# Patient Record
Sex: FEMALE | Race: WHITE | NOT HISPANIC OR LATINO | URBAN - METROPOLITAN AREA
[De-identification: names, ages, dates, MRNs, and addresses within clinical notes are randomized per-mention and may not be internally consistent; named-entity substitution may affect disease eponyms.]

---

## 2017-01-03 ENCOUNTER — OFFICE VISIT - HEALTHEAST (OUTPATIENT)
Dept: INTERNAL MEDICINE | Facility: CLINIC | Age: 82
End: 2017-01-03

## 2017-01-03 DIAGNOSIS — M17.9 DJD (DEGENERATIVE JOINT DISEASE) OF KNEE: ICD-10-CM

## 2017-01-03 DIAGNOSIS — R15.2 DEFECATION URGENCY: ICD-10-CM

## 2017-01-03 DIAGNOSIS — M47.816 DEGENERATIVE JOINT DISEASE (DJD) OF LUMBAR SPINE: ICD-10-CM

## 2017-01-03 DIAGNOSIS — K58.8 OTHER IRRITABLE BOWEL SYNDROME: ICD-10-CM

## 2017-01-03 ASSESSMENT — MIFFLIN-ST. JEOR: SCORE: 904.48

## 2017-02-06 ENCOUNTER — RECORDS - HEALTHEAST (OUTPATIENT)
Dept: ADMINISTRATIVE | Facility: OTHER | Age: 82
End: 2017-02-06

## 2017-02-07 ENCOUNTER — COMMUNICATION - HEALTHEAST (OUTPATIENT)
Dept: INTERNAL MEDICINE | Facility: CLINIC | Age: 82
End: 2017-02-07

## 2017-02-07 DIAGNOSIS — G47.00 INSOMNIA: ICD-10-CM

## 2017-02-16 ENCOUNTER — OFFICE VISIT - HEALTHEAST (OUTPATIENT)
Dept: INTERNAL MEDICINE | Facility: CLINIC | Age: 82
End: 2017-02-16

## 2017-02-16 DIAGNOSIS — S16.1XXA: ICD-10-CM

## 2017-02-16 DIAGNOSIS — R59.0 ENLARGED LYMPH NODE IN NECK: ICD-10-CM

## 2017-02-16 ASSESSMENT — MIFFLIN-ST. JEOR: SCORE: 931.7

## 2017-02-20 ENCOUNTER — TRANSFERRED RECORDS (OUTPATIENT)
Dept: HEALTH INFORMATION MANAGEMENT | Facility: CLINIC | Age: 82
End: 2017-02-20

## 2017-03-06 ENCOUNTER — COMMUNICATION - HEALTHEAST (OUTPATIENT)
Dept: INTERNAL MEDICINE | Facility: CLINIC | Age: 82
End: 2017-03-06

## 2017-03-07 ENCOUNTER — COMMUNICATION - HEALTHEAST (OUTPATIENT)
Dept: INTERNAL MEDICINE | Facility: CLINIC | Age: 82
End: 2017-03-07

## 2017-03-07 ENCOUNTER — OFFICE VISIT - HEALTHEAST (OUTPATIENT)
Dept: INTERNAL MEDICINE | Facility: CLINIC | Age: 82
End: 2017-03-07

## 2017-03-07 DIAGNOSIS — C18.0 MALIGNANT NEOPLASM OF CECUM (H): ICD-10-CM

## 2017-03-07 DIAGNOSIS — R73.9 HYPERGLYCEMIA: ICD-10-CM

## 2017-03-07 DIAGNOSIS — G25.81 RESTLESS LEGS SYNDROME (RLS): ICD-10-CM

## 2017-03-07 DIAGNOSIS — C50.919 MALIGNANT NEOPLASM OF FEMALE BREAST (H): ICD-10-CM

## 2017-03-07 DIAGNOSIS — R15.2 DEFECATION URGENCY: ICD-10-CM

## 2017-03-07 DIAGNOSIS — Z79.899 MEDICATION MANAGEMENT: ICD-10-CM

## 2017-03-07 LAB
CHOLEST SERPL-MCNC: 194 MG/DL
FASTING STATUS PATIENT QL REPORTED: YES
HBA1C MFR BLD: 5.8 % (ref 3.5–6)
HDLC SERPL-MCNC: 60 MG/DL
LDLC SERPL CALC-MCNC: 115 MG/DL
TRIGL SERPL-MCNC: 94 MG/DL

## 2017-03-07 ASSESSMENT — MIFFLIN-ST. JEOR: SCORE: 931.7

## 2017-03-17 ENCOUNTER — COMMUNICATION - HEALTHEAST (OUTPATIENT)
Dept: INTERNAL MEDICINE | Facility: CLINIC | Age: 82
End: 2017-03-17

## 2017-04-04 ENCOUNTER — HOSPITAL ENCOUNTER (OUTPATIENT)
Dept: EDUCATION SERVICES | Facility: CLINIC | Age: 82
End: 2017-04-04
Payer: MEDICARE

## 2017-04-10 ENCOUNTER — RECORDS - HEALTHEAST (OUTPATIENT)
Dept: ADMINISTRATIVE | Facility: OTHER | Age: 82
End: 2017-04-10

## 2017-05-08 ENCOUNTER — COMMUNICATION - HEALTHEAST (OUTPATIENT)
Dept: INTERNAL MEDICINE | Facility: CLINIC | Age: 82
End: 2017-05-08

## 2017-05-08 DIAGNOSIS — G47.00 INSOMNIA: ICD-10-CM

## 2017-06-02 ENCOUNTER — COMMUNICATION - HEALTHEAST (OUTPATIENT)
Dept: INTERNAL MEDICINE | Facility: CLINIC | Age: 82
End: 2017-06-02

## 2017-06-03 ENCOUNTER — COMMUNICATION - HEALTHEAST (OUTPATIENT)
Dept: INTERNAL MEDICINE | Facility: CLINIC | Age: 82
End: 2017-06-03

## 2017-06-05 ENCOUNTER — OFFICE VISIT - HEALTHEAST (OUTPATIENT)
Dept: INTERNAL MEDICINE | Facility: CLINIC | Age: 82
End: 2017-06-05

## 2017-06-05 DIAGNOSIS — K58.8 OTHER IRRITABLE BOWEL SYNDROME: ICD-10-CM

## 2017-06-05 DIAGNOSIS — C50.919 MALIGNANT NEOPLASM OF FEMALE BREAST (H): ICD-10-CM

## 2017-06-05 DIAGNOSIS — G43.909 MIGRAINE: ICD-10-CM

## 2017-06-05 DIAGNOSIS — C18.0 MALIGNANT NEOPLASM OF CECUM (H): ICD-10-CM

## 2017-06-05 DIAGNOSIS — M17.9 OSTEOARTHRITIS, KNEE: ICD-10-CM

## 2017-06-05 DIAGNOSIS — G25.81 RESTLESS LEGS SYNDROME (RLS): ICD-10-CM

## 2017-06-05 DIAGNOSIS — Z01.818 PREOP EXAMINATION: ICD-10-CM

## 2017-06-05 LAB
ATRIAL RATE - MUSE: 76 BPM
DIASTOLIC BLOOD PRESSURE - MUSE: NORMAL MMHG
INTERPRETATION ECG - MUSE: NORMAL
P AXIS - MUSE: 74 DEGREES
PR INTERVAL - MUSE: 200 MS
QRS DURATION - MUSE: 80 MS
QT - MUSE: 410 MS
QTC - MUSE: 457 MS
R AXIS - MUSE: 0 DEGREES
SYSTOLIC BLOOD PRESSURE - MUSE: NORMAL MMHG
T AXIS - MUSE: 30 DEGREES
VENTRICULAR RATE- MUSE: 75 BPM

## 2017-06-05 ASSESSMENT — MIFFLIN-ST. JEOR: SCORE: 909.02

## 2017-06-17 ENCOUNTER — COMMUNICATION - HEALTHEAST (OUTPATIENT)
Dept: INTERNAL MEDICINE | Facility: CLINIC | Age: 82
End: 2017-06-17

## 2017-06-23 ENCOUNTER — APPOINTMENT (OUTPATIENT)
Dept: SURGERY | Facility: CLINIC | Age: 82
End: 2017-06-23

## 2017-06-23 RX ORDER — DIPHENOXYLATE HCL/ATROPINE 2.5-.025MG
1 TABLET ORAL 4 TIMES DAILY PRN
COMMUNITY

## 2017-06-23 RX ORDER — FLUTICASONE PROPIONATE 50 MCG
2 SPRAY, SUSPENSION (ML) NASAL DAILY
COMMUNITY
End: 2021-12-23

## 2017-06-23 RX ORDER — BUTALBITAL/ASPIRIN/CAFFEINE 50-325-40
1 CAPSULE ORAL 3 TIMES DAILY PRN
COMMUNITY

## 2017-06-23 RX ORDER — DICYCLOMINE HCL 20 MG
20 TABLET ORAL 3 TIMES DAILY PRN
COMMUNITY

## 2017-06-23 RX ORDER — IRBESARTAN AND HYDROCHLOROTHIAZIDE 300; 12.5 MG/1; MG/1
1 TABLET, FILM COATED ORAL DAILY
COMMUNITY

## 2017-06-23 RX ORDER — SENNOSIDES 8.6 MG
650 CAPSULE ORAL EVERY 8 HOURS PRN
COMMUNITY

## 2017-06-23 RX ORDER — SUMATRIPTAN 25 MG/1
25 TABLET, FILM COATED ORAL
COMMUNITY

## 2017-06-25 ENCOUNTER — ANESTHESIA EVENT (OUTPATIENT)
Dept: SURGERY | Facility: CLINIC | Age: 82
DRG: 470 | End: 2017-06-25
Payer: MEDICARE

## 2017-06-26 ENCOUNTER — ANESTHESIA (OUTPATIENT)
Dept: SURGERY | Facility: CLINIC | Age: 82
DRG: 470 | End: 2017-06-26
Payer: MEDICARE

## 2017-06-26 ENCOUNTER — HOSPITAL ENCOUNTER (INPATIENT)
Facility: CLINIC | Age: 82
LOS: 3 days | Discharge: HOME | DRG: 470 | End: 2017-06-29
Attending: ORTHOPAEDIC SURGERY | Admitting: ORTHOPAEDIC SURGERY
Payer: MEDICARE

## 2017-06-26 ENCOUNTER — APPOINTMENT (OUTPATIENT)
Dept: PHYSICAL THERAPY | Facility: CLINIC | Age: 82
DRG: 470 | End: 2017-06-26
Attending: ORTHOPAEDIC SURGERY
Payer: MEDICARE

## 2017-06-26 ENCOUNTER — APPOINTMENT (OUTPATIENT)
Dept: GENERAL RADIOLOGY | Facility: CLINIC | Age: 82
DRG: 470 | End: 2017-06-26
Attending: ORTHOPAEDIC SURGERY
Payer: MEDICARE

## 2017-06-26 DIAGNOSIS — Z96.651 HX OF TOTAL KNEE ARTHROPLASTY, RIGHT: Primary | ICD-10-CM

## 2017-06-26 PROBLEM — Z96.659 HX OF TOTAL KNEE ARTHROPLASTY: Status: ACTIVE | Noted: 2017-06-26

## 2017-06-26 PROBLEM — Z41.9 ELECTIVE SURGERY: Status: ACTIVE | Noted: 2017-06-26

## 2017-06-26 PROCEDURE — 40000986 XR KNEE PORT RT 1/2 VW: Mod: RT

## 2017-06-26 PROCEDURE — 25000125 ZZHC RX 250: Performed by: ANESTHESIOLOGY

## 2017-06-26 PROCEDURE — 25000132 ZZH RX MED GY IP 250 OP 250 PS 637: Mod: GY | Performed by: INTERNAL MEDICINE

## 2017-06-26 PROCEDURE — 27210995 ZZH RX 272: Performed by: ORTHOPAEDIC SURGERY

## 2017-06-26 PROCEDURE — 36000074 ZZH SURGERY LEVEL 6 1ST 30 MIN - UMMC: Performed by: ORTHOPAEDIC SURGERY

## 2017-06-26 PROCEDURE — 37000009 ZZH ANESTHESIA TECHNICAL FEE, EACH ADDTL 15 MIN: Performed by: ORTHOPAEDIC SURGERY

## 2017-06-26 PROCEDURE — 97110 THERAPEUTIC EXERCISES: CPT | Mod: GP

## 2017-06-26 PROCEDURE — 71000015 ZZH RECOVERY PHASE 1 LEVEL 2 EA ADDTL HR: Performed by: ORTHOPAEDIC SURGERY

## 2017-06-26 PROCEDURE — 97161 PT EVAL LOW COMPLEX 20 MIN: CPT | Mod: GP

## 2017-06-26 PROCEDURE — 25000128 H RX IP 250 OP 636: Performed by: NURSE ANESTHETIST, CERTIFIED REGISTERED

## 2017-06-26 PROCEDURE — 40000170 ZZH STATISTIC PRE-PROCEDURE ASSESSMENT II: Performed by: ORTHOPAEDIC SURGERY

## 2017-06-26 PROCEDURE — 25000125 ZZHC RX 250: Performed by: NURSE ANESTHETIST, CERTIFIED REGISTERED

## 2017-06-26 PROCEDURE — C1776 JOINT DEVICE (IMPLANTABLE): HCPCS | Performed by: ORTHOPAEDIC SURGERY

## 2017-06-26 PROCEDURE — 40000193 ZZH STATISTIC PT WARD VISIT

## 2017-06-26 PROCEDURE — 99232 SBSQ HOSP IP/OBS MODERATE 35: CPT | Performed by: INTERNAL MEDICINE

## 2017-06-26 PROCEDURE — 25000128 H RX IP 250 OP 636: Performed by: ANESTHESIOLOGY

## 2017-06-26 PROCEDURE — 25000128 H RX IP 250 OP 636: Performed by: ORTHOPAEDIC SURGERY

## 2017-06-26 PROCEDURE — 25800025 ZZH RX 258: Performed by: ORTHOPAEDIC SURGERY

## 2017-06-26 PROCEDURE — A9270 NON-COVERED ITEM OR SERVICE: HCPCS | Mod: GY | Performed by: INTERNAL MEDICINE

## 2017-06-26 PROCEDURE — A9270 NON-COVERED ITEM OR SERVICE: HCPCS | Mod: GY | Performed by: NURSE PRACTITIONER

## 2017-06-26 PROCEDURE — C9290 INJ, BUPIVACAINE LIPOSOME: HCPCS | Performed by: ANESTHESIOLOGY

## 2017-06-26 PROCEDURE — 27810169 ZZH OR IMPLANT GENERAL: Performed by: ORTHOPAEDIC SURGERY

## 2017-06-26 PROCEDURE — 25000132 ZZH RX MED GY IP 250 OP 250 PS 637: Mod: GY | Performed by: ANESTHESIOLOGY

## 2017-06-26 PROCEDURE — 25000132 ZZH RX MED GY IP 250 OP 250 PS 637: Mod: GY | Performed by: NURSE PRACTITIONER

## 2017-06-26 PROCEDURE — 37000008 ZZH ANESTHESIA TECHNICAL FEE, 1ST 30 MIN: Performed by: ORTHOPAEDIC SURGERY

## 2017-06-26 PROCEDURE — 71000014 ZZH RECOVERY PHASE 1 LEVEL 2 FIRST HR: Performed by: ORTHOPAEDIC SURGERY

## 2017-06-26 PROCEDURE — A9270 NON-COVERED ITEM OR SERVICE: HCPCS | Mod: GY | Performed by: ORTHOPAEDIC SURGERY

## 2017-06-26 PROCEDURE — A9270 NON-COVERED ITEM OR SERVICE: HCPCS | Mod: GY | Performed by: ANESTHESIOLOGY

## 2017-06-26 PROCEDURE — 25000125 ZZHC RX 250: Performed by: ORTHOPAEDIC SURGERY

## 2017-06-26 PROCEDURE — 25000132 ZZH RX MED GY IP 250 OP 250 PS 637: Mod: GY | Performed by: ORTHOPAEDIC SURGERY

## 2017-06-26 PROCEDURE — 27210794 ZZH OR GENERAL SUPPLY STERILE: Performed by: ORTHOPAEDIC SURGERY

## 2017-06-26 PROCEDURE — 97530 THERAPEUTIC ACTIVITIES: CPT | Mod: GP

## 2017-06-26 PROCEDURE — 12000001 ZZH R&B MED SURG/OB UMMC

## 2017-06-26 PROCEDURE — 36000076 ZZH SURGERY LEVEL 6 EA 15 ADDTL MIN - UMMC: Performed by: ORTHOPAEDIC SURGERY

## 2017-06-26 PROCEDURE — 99207 ZZC CONSULT E&M CHANGED TO SUBSEQUENT LEVEL: CPT | Performed by: INTERNAL MEDICINE

## 2017-06-26 PROCEDURE — 0SRC0J9 REPLACEMENT OF RIGHT KNEE JOINT WITH SYNTHETIC SUBSTITUTE, CEMENTED, OPEN APPROACH: ICD-10-PCS | Performed by: ORTHOPAEDIC SURGERY

## 2017-06-26 DEVICE — IMP BASEPLATE TIBIAL HOWM TRI 3 5520-B-300: Type: IMPLANTABLE DEVICE | Site: KNEE | Status: FUNCTIONAL

## 2017-06-26 DEVICE — BONE CEMENT SIMPLEX W/TOBRAMYCIN 6197-9-001: Type: IMPLANTABLE DEVICE | Site: KNEE | Status: FUNCTIONAL

## 2017-06-26 DEVICE — IMP COMP FEM STRK TRIATHLN CR RT 3 5510-F-302: Type: IMPLANTABLE DEVICE | Site: KNEE | Status: FUNCTIONAL

## 2017-06-26 DEVICE — IMPLANTABLE DEVICE: Type: IMPLANTABLE DEVICE | Site: KNEE | Status: FUNCTIONAL

## 2017-06-26 DEVICE — IMP COMP PATELLA HOWM TRI ASYM 29X09MM 5551-G-299: Type: IMPLANTABLE DEVICE | Site: KNEE | Status: FUNCTIONAL

## 2017-06-26 RX ORDER — GABAPENTIN 100 MG/1
100 CAPSULE ORAL
Status: COMPLETED | OUTPATIENT
Start: 2017-06-26 | End: 2017-06-26

## 2017-06-26 RX ORDER — AMOXICILLIN 250 MG
1-2 CAPSULE ORAL 2 TIMES DAILY
Status: DISCONTINUED | OUTPATIENT
Start: 2017-06-26 | End: 2017-06-29 | Stop reason: HOSPADM

## 2017-06-26 RX ORDER — ONDANSETRON 2 MG/ML
4 INJECTION INTRAMUSCULAR; INTRAVENOUS EVERY 30 MIN PRN
Status: DISCONTINUED | OUTPATIENT
Start: 2017-06-26 | End: 2017-06-26 | Stop reason: HOSPADM

## 2017-06-26 RX ORDER — PROPOFOL 10 MG/ML
INJECTION, EMULSION INTRAVENOUS CONTINUOUS PRN
Status: DISCONTINUED | OUTPATIENT
Start: 2017-06-26 | End: 2017-06-26

## 2017-06-26 RX ORDER — IRBESARTAN AND HYDROCHLOROTHIAZIDE 300; 12.5 MG/1; MG/1
1 TABLET, FILM COATED ORAL
Status: DISCONTINUED | OUTPATIENT
Start: 2017-06-26 | End: 2017-06-29 | Stop reason: HOSPADM

## 2017-06-26 RX ORDER — ONDANSETRON 4 MG/1
4 TABLET, ORALLY DISINTEGRATING ORAL EVERY 6 HOURS PRN
Status: DISCONTINUED | OUTPATIENT
Start: 2017-06-26 | End: 2017-06-29 | Stop reason: HOSPADM

## 2017-06-26 RX ORDER — CEFAZOLIN SODIUM 1 G/3ML
1 INJECTION, POWDER, FOR SOLUTION INTRAMUSCULAR; INTRAVENOUS SEE ADMIN INSTRUCTIONS
Status: DISCONTINUED | OUTPATIENT
Start: 2017-06-26 | End: 2017-06-26 | Stop reason: HOSPADM

## 2017-06-26 RX ORDER — FLUTICASONE PROPIONATE 50 MCG
2 SPRAY, SUSPENSION (ML) NASAL DAILY
Status: DISCONTINUED | OUTPATIENT
Start: 2017-06-26 | End: 2017-06-29 | Stop reason: HOSPADM

## 2017-06-26 RX ORDER — DICYCLOMINE HCL 20 MG
20 TABLET ORAL 3 TIMES DAILY PRN
Status: DISCONTINUED | OUTPATIENT
Start: 2017-06-26 | End: 2017-06-29 | Stop reason: HOSPADM

## 2017-06-26 RX ORDER — CEFAZOLIN SODIUM 2 G/100ML
2 INJECTION, SOLUTION INTRAVENOUS
Status: COMPLETED | OUTPATIENT
Start: 2017-06-26 | End: 2017-06-26

## 2017-06-26 RX ORDER — SUMATRIPTAN 25 MG/1
25 TABLET, FILM COATED ORAL
Status: DISCONTINUED | OUTPATIENT
Start: 2017-06-26 | End: 2017-06-29 | Stop reason: HOSPADM

## 2017-06-26 RX ORDER — HYDROCODONE BITARTRATE AND ACETAMINOPHEN 5; 325 MG/1; MG/1
1-2 TABLET ORAL EVERY 4 HOURS PRN
Status: DISCONTINUED | OUTPATIENT
Start: 2017-06-26 | End: 2017-06-26

## 2017-06-26 RX ORDER — ASPIRIN 325 MG/1
325 TABLET, FILM COATED ORAL DAILY
Status: DISCONTINUED | OUTPATIENT
Start: 2017-06-26 | End: 2017-06-29 | Stop reason: HOSPADM

## 2017-06-26 RX ORDER — LIDOCAINE HYDROCHLORIDE 20 MG/ML
INJECTION, SOLUTION INFILTRATION; PERINEURAL PRN
Status: DISCONTINUED | OUTPATIENT
Start: 2017-06-26 | End: 2017-06-26

## 2017-06-26 RX ORDER — CELECOXIB 200 MG/1
200 CAPSULE ORAL ONCE
Status: COMPLETED | OUTPATIENT
Start: 2017-06-26 | End: 2017-06-26

## 2017-06-26 RX ORDER — BUPIVACAINE HYDROCHLORIDE 7.5 MG/ML
INJECTION, SOLUTION INTRASPINAL PRN
Status: DISCONTINUED | OUTPATIENT
Start: 2017-06-26 | End: 2017-06-26

## 2017-06-26 RX ORDER — LIDOCAINE 40 MG/G
CREAM TOPICAL
Status: DISCONTINUED | OUTPATIENT
Start: 2017-06-26 | End: 2017-06-29 | Stop reason: HOSPADM

## 2017-06-26 RX ORDER — FENTANYL CITRATE 50 UG/ML
25-50 INJECTION, SOLUTION INTRAMUSCULAR; INTRAVENOUS
Status: DISCONTINUED | OUTPATIENT
Start: 2017-06-26 | End: 2017-06-26 | Stop reason: HOSPADM

## 2017-06-26 RX ORDER — DEXAMETHASONE SODIUM PHOSPHATE 4 MG/ML
INJECTION, SOLUTION INTRA-ARTICULAR; INTRALESIONAL; INTRAMUSCULAR; INTRAVENOUS; SOFT TISSUE PRN
Status: DISCONTINUED | OUTPATIENT
Start: 2017-06-26 | End: 2017-06-26

## 2017-06-26 RX ORDER — DIPHENOXYLATE HCL/ATROPINE 2.5-.025MG
1 TABLET ORAL 4 TIMES DAILY PRN
Status: DISCONTINUED | OUTPATIENT
Start: 2017-06-26 | End: 2017-06-29 | Stop reason: HOSPADM

## 2017-06-26 RX ORDER — SODIUM CHLORIDE, SODIUM LACTATE, POTASSIUM CHLORIDE, CALCIUM CHLORIDE 600; 310; 30; 20 MG/100ML; MG/100ML; MG/100ML; MG/100ML
INJECTION, SOLUTION INTRAVENOUS CONTINUOUS
Status: ACTIVE | OUTPATIENT
Start: 2017-06-26 | End: 2017-06-26

## 2017-06-26 RX ORDER — NALOXONE HYDROCHLORIDE 0.4 MG/ML
.1-.4 INJECTION, SOLUTION INTRAMUSCULAR; INTRAVENOUS; SUBCUTANEOUS
Status: DISCONTINUED | OUTPATIENT
Start: 2017-06-26 | End: 2017-06-26 | Stop reason: HOSPADM

## 2017-06-26 RX ORDER — CEFAZOLIN SODIUM 1 G/3ML
1 INJECTION, POWDER, FOR SOLUTION INTRAMUSCULAR; INTRAVENOUS EVERY 8 HOURS
Status: COMPLETED | OUTPATIENT
Start: 2017-06-26 | End: 2017-06-27

## 2017-06-26 RX ORDER — MAGNESIUM HYDROXIDE 1200 MG/15ML
LIQUID ORAL PRN
Status: DISCONTINUED | OUTPATIENT
Start: 2017-06-26 | End: 2017-06-26 | Stop reason: HOSPADM

## 2017-06-26 RX ORDER — NALOXONE HYDROCHLORIDE 0.4 MG/ML
.1-.4 INJECTION, SOLUTION INTRAMUSCULAR; INTRAVENOUS; SUBCUTANEOUS
Status: DISCONTINUED | OUTPATIENT
Start: 2017-06-26 | End: 2017-06-29 | Stop reason: HOSPADM

## 2017-06-26 RX ORDER — FLUMAZENIL 0.1 MG/ML
0.2 INJECTION, SOLUTION INTRAVENOUS
Status: DISCONTINUED | OUTPATIENT
Start: 2017-06-26 | End: 2017-06-26 | Stop reason: HOSPADM

## 2017-06-26 RX ORDER — BUPIVACAINE HYDROCHLORIDE AND EPINEPHRINE 2.5; 5 MG/ML; UG/ML
INJECTION, SOLUTION INFILTRATION; PERINEURAL PRN
Status: DISCONTINUED | OUTPATIENT
Start: 2017-06-26 | End: 2017-06-26

## 2017-06-26 RX ORDER — PRAMIPEXOLE DIHYDROCHLORIDE 1 MG/1
1 TABLET ORAL AT BEDTIME
Status: DISCONTINUED | OUTPATIENT
Start: 2017-06-26 | End: 2017-06-29 | Stop reason: HOSPADM

## 2017-06-26 RX ORDER — BUPIVACAINE HYDROCHLORIDE AND EPINEPHRINE 5; 5 MG/ML; UG/ML
INJECTION, SOLUTION PERINEURAL PRN
Status: DISCONTINUED | OUTPATIENT
Start: 2017-06-26 | End: 2017-06-26 | Stop reason: HOSPADM

## 2017-06-26 RX ORDER — ONDANSETRON 2 MG/ML
4 INJECTION INTRAMUSCULAR; INTRAVENOUS EVERY 6 HOURS PRN
Status: DISCONTINUED | OUTPATIENT
Start: 2017-06-26 | End: 2017-06-29 | Stop reason: HOSPADM

## 2017-06-26 RX ORDER — GABAPENTIN 800 MG/1
800 TABLET ORAL AT BEDTIME
Status: DISCONTINUED | OUTPATIENT
Start: 2017-06-26 | End: 2017-06-29 | Stop reason: HOSPADM

## 2017-06-26 RX ORDER — BUTALBITAL/ASPIRIN/CAFFEINE 50-325-40
1 CAPSULE ORAL 3 TIMES DAILY PRN
Status: DISCONTINUED | OUTPATIENT
Start: 2017-06-26 | End: 2017-06-29 | Stop reason: HOSPADM

## 2017-06-26 RX ORDER — SODIUM CHLORIDE, SODIUM LACTATE, POTASSIUM CHLORIDE, CALCIUM CHLORIDE 600; 310; 30; 20 MG/100ML; MG/100ML; MG/100ML; MG/100ML
INJECTION, SOLUTION INTRAVENOUS CONTINUOUS
Status: DISCONTINUED | OUTPATIENT
Start: 2017-06-26 | End: 2017-06-26 | Stop reason: HOSPADM

## 2017-06-26 RX ORDER — OXYCODONE HYDROCHLORIDE 5 MG/1
5-10 TABLET ORAL EVERY 4 HOURS PRN
Status: DISCONTINUED | OUTPATIENT
Start: 2017-06-26 | End: 2017-06-27

## 2017-06-26 RX ORDER — ACETAMINOPHEN 325 MG/1
975 TABLET ORAL ONCE
Status: COMPLETED | OUTPATIENT
Start: 2017-06-26 | End: 2017-06-26

## 2017-06-26 RX ORDER — SODIUM CHLORIDE, SODIUM LACTATE, POTASSIUM CHLORIDE, CALCIUM CHLORIDE 600; 310; 30; 20 MG/100ML; MG/100ML; MG/100ML; MG/100ML
INJECTION, SOLUTION INTRAVENOUS CONTINUOUS PRN
Status: DISCONTINUED | OUTPATIENT
Start: 2017-06-26 | End: 2017-06-26

## 2017-06-26 RX ORDER — FENTANYL CITRATE 50 UG/ML
25-50 INJECTION, SOLUTION INTRAMUSCULAR; INTRAVENOUS EVERY 5 MIN PRN
Status: DISCONTINUED | OUTPATIENT
Start: 2017-06-26 | End: 2017-06-26 | Stop reason: HOSPADM

## 2017-06-26 RX ORDER — PRAMIPEXOLE DIHYDROCHLORIDE 0.5 MG/1
0.5 TABLET ORAL
COMMUNITY
Start: 2016-01-15

## 2017-06-26 RX ORDER — CELECOXIB 50 MG/1
50 CAPSULE ORAL 2 TIMES DAILY
Status: DISCONTINUED | OUTPATIENT
Start: 2017-06-26 | End: 2017-06-26

## 2017-06-26 RX ORDER — GABAPENTIN 800 MG/1
800 TABLET ORAL DAILY
Status: ON HOLD | COMMUNITY
Start: 2012-08-05 | End: 2017-06-26

## 2017-06-26 RX ORDER — HYDROMORPHONE HYDROCHLORIDE 1 MG/ML
.3-.5 INJECTION, SOLUTION INTRAMUSCULAR; INTRAVENOUS; SUBCUTANEOUS EVERY 10 MIN PRN
Status: DISCONTINUED | OUTPATIENT
Start: 2017-06-26 | End: 2017-06-26 | Stop reason: HOSPADM

## 2017-06-26 RX ORDER — ONDANSETRON 2 MG/ML
INJECTION INTRAMUSCULAR; INTRAVENOUS PRN
Status: DISCONTINUED | OUTPATIENT
Start: 2017-06-26 | End: 2017-06-26

## 2017-06-26 RX ORDER — ONDANSETRON 4 MG/1
4 TABLET, ORALLY DISINTEGRATING ORAL EVERY 30 MIN PRN
Status: DISCONTINUED | OUTPATIENT
Start: 2017-06-26 | End: 2017-06-26 | Stop reason: HOSPADM

## 2017-06-26 RX ADMIN — PROPOFOL 75 MCG/KG/MIN: 10 INJECTION, EMULSION INTRAVENOUS at 09:52

## 2017-06-26 RX ADMIN — SODIUM CHLORIDE, POTASSIUM CHLORIDE, SODIUM LACTATE AND CALCIUM CHLORIDE: 600; 310; 30; 20 INJECTION, SOLUTION INTRAVENOUS at 11:25

## 2017-06-26 RX ADMIN — ACETAMINOPHEN 975 MG: 325 TABLET, FILM COATED ORAL at 08:05

## 2017-06-26 RX ADMIN — GABAPENTIN 100 MG: 100 CAPSULE ORAL at 08:05

## 2017-06-26 RX ADMIN — OXYCODONE HYDROCHLORIDE 5 MG: 5 TABLET ORAL at 22:11

## 2017-06-26 RX ADMIN — SENNOSIDES AND DOCUSATE SODIUM 2 TABLET: 8.6; 5 TABLET ORAL at 19:31

## 2017-06-26 RX ADMIN — PHENYLEPHRINE HYDROCHLORIDE 100 MCG: 10 INJECTION, SOLUTION INTRAMUSCULAR; INTRAVENOUS; SUBCUTANEOUS at 10:35

## 2017-06-26 RX ADMIN — OXYCODONE HYDROCHLORIDE 5 MG: 5 TABLET ORAL at 18:17

## 2017-06-26 RX ADMIN — BUPIVACAINE HYDROCHLORIDE AND EPINEPHRINE BITARTRATE 10 ML: 2.5; .005 INJECTION, SOLUTION INFILTRATION; PERINEURAL at 08:49

## 2017-06-26 RX ADMIN — LIDOCAINE HYDROCHLORIDE 20 MG: 20 INJECTION, SOLUTION INFILTRATION; PERINEURAL at 10:08

## 2017-06-26 RX ADMIN — FENTANYL CITRATE 50 MCG: 50 INJECTION, SOLUTION INTRAMUSCULAR; INTRAVENOUS at 08:49

## 2017-06-26 RX ADMIN — PHENYLEPHRINE HYDROCHLORIDE 100 MCG: 10 INJECTION, SOLUTION INTRAMUSCULAR; INTRAVENOUS; SUBCUTANEOUS at 11:04

## 2017-06-26 RX ADMIN — CEFAZOLIN SODIUM 2 G: 2 INJECTION, SOLUTION INTRAVENOUS at 09:55

## 2017-06-26 RX ADMIN — GABAPENTIN 800 MG: 800 TABLET, FILM COATED ORAL at 20:56

## 2017-06-26 RX ADMIN — PRAMIPEXOLE DIHYDROCHLORIDE 1 MG: 1 TABLET ORAL at 20:56

## 2017-06-26 RX ADMIN — BUPIVACAINE 10 ML: 13.3 INJECTION, SUSPENSION, LIPOSOMAL INFILTRATION at 08:49

## 2017-06-26 RX ADMIN — OXYCODONE HYDROCHLORIDE 5 MG: 5 TABLET ORAL at 15:03

## 2017-06-26 RX ADMIN — PHENYLEPHRINE HYDROCHLORIDE 100 MCG: 10 INJECTION, SOLUTION INTRAMUSCULAR; INTRAVENOUS; SUBCUTANEOUS at 09:52

## 2017-06-26 RX ADMIN — LIDOCAINE HYDROCHLORIDE 20 MG: 20 INJECTION, SOLUTION INFILTRATION; PERINEURAL at 10:11

## 2017-06-26 RX ADMIN — CEFAZOLIN 1 G: 1 INJECTION, POWDER, FOR SOLUTION INTRAMUSCULAR; INTRAVENOUS at 18:16

## 2017-06-26 RX ADMIN — CELECOXIB 200 MG: 200 CAPSULE ORAL at 08:05

## 2017-06-26 RX ADMIN — ASPIRIN 325 MG: 325 TABLET, FILM COATED ORAL at 15:03

## 2017-06-26 RX ADMIN — MIDAZOLAM HYDROCHLORIDE 0.5 MG: 1 INJECTION, SOLUTION INTRAMUSCULAR; INTRAVENOUS at 09:29

## 2017-06-26 RX ADMIN — MIDAZOLAM HYDROCHLORIDE 0.5 MG: 1 INJECTION, SOLUTION INTRAMUSCULAR; INTRAVENOUS at 10:52

## 2017-06-26 RX ADMIN — ONDANSETRON 4 MG: 2 INJECTION INTRAMUSCULAR; INTRAVENOUS at 10:54

## 2017-06-26 RX ADMIN — BUPIVACAINE HYDROCHLORIDE IN DEXTROSE 1.4 ML: 7.5 INJECTION, SOLUTION SUBARACHNOID at 09:50

## 2017-06-26 RX ADMIN — Medication 50 MG: at 22:11

## 2017-06-26 RX ADMIN — PHENYLEPHRINE HYDROCHLORIDE 100 MCG: 10 INJECTION, SOLUTION INTRAMUSCULAR; INTRAVENOUS; SUBCUTANEOUS at 11:20

## 2017-06-26 RX ADMIN — SODIUM CHLORIDE, POTASSIUM CHLORIDE, SODIUM LACTATE AND CALCIUM CHLORIDE: 600; 310; 30; 20 INJECTION, SOLUTION INTRAVENOUS at 09:30

## 2017-06-26 RX ADMIN — DEXAMETHASONE SODIUM PHOSPHATE 6 MG: 4 INJECTION, SOLUTION INTRAMUSCULAR; INTRAVENOUS at 10:00

## 2017-06-26 RX ADMIN — SODIUM CHLORIDE 1 G: 9 INJECTION, SOLUTION INTRAVENOUS at 09:57

## 2017-06-26 RX ADMIN — PHENYLEPHRINE HYDROCHLORIDE 100 MCG: 10 INJECTION, SOLUTION INTRAMUSCULAR; INTRAVENOUS; SUBCUTANEOUS at 10:51

## 2017-06-26 ASSESSMENT — LIFESTYLE VARIABLES: TOBACCO_USE: 0

## 2017-06-26 ASSESSMENT — ENCOUNTER SYMPTOMS: SEIZURES: 0

## 2017-06-26 NOTE — ANESTHESIA CARE TRANSFER NOTE
Patient: Mitra Hall    Procedure(s):  Optical Tracking System Right Total Knee Arthroplasty - Wound Class: I-Clean    Diagnosis: Osteoarthritis Right Knee   Diagnosis Additional Information: No value filed.    Anesthesia Type:   MAC, Spinal     Note:  Airway :Face Mask  Patient transferred to:PACU  Comments: Mitra arrived in PACU speaking to her caregivers.  Report given and all questions answered.      Vitals: (Last set prior to Anesthesia Care Transfer)    CRNA VITALS  6/26/2017 1103 - 6/26/2017 1138      6/26/2017             Resp Rate (set): 10                Electronically Signed By: Jadon Dumas CRNA, APRN CRNA  June 26, 2017  11:38 AM

## 2017-06-26 NOTE — ANESTHESIA PREPROCEDURE EVALUATION
Anesthesia Evaluation     . Pt has had prior anesthetic. Type: General    No history of anesthetic complications          ROS/MED HX    ENT/Pulmonary:  - neg pulmonary ROS    (-) tobacco use, asthma and sleep apnea   Neurologic:     (+)migraines,    (-) seizures, CVA and TIA   Cardiovascular:  - neg cardiovascular ROS       METS/Exercise Tolerance: Comment: Bikes for exercise >4 METS   Hematologic:  - neg hematologic  ROS       Musculoskeletal:  - neg musculoskeletal ROS       GI/Hepatic: Comment: IBS - neg GI/hepatic ROS       Renal/Genitourinary:  - ROS Renal section negative       Endo:  - neg endo ROS       Psychiatric: Comment: RLS - gabapentin          Infectious Disease:         Malignancy:   (+) Malignancy History of Breast and GI          Other:                     Physical Exam  Normal systems: dental    Airway   Mallampati: II  TM distance: >3 FB  Neck ROM: full    Dental     Cardiovascular       Pulmonary                     Anesthesia Plan      History & Physical Review  History and physical reviewed and following examination; no interval change.    ASA Status:  2 .    NPO Status:  > 2 hours and > 6 hours    Plan for Spinal and MAC with Intravenous induction. Reason for MAC:  Deep or markedly invasive procedure (G8)  PONV prophylaxis:  Ondansetron (or other 5HT-3) and Dexamethasone or Solumedrol  Spinal anesthesia was discussed with the patient. They understand the risks include headache, pruritus, nausea/vomiting, blood pressure changes, bleeding, infection, nerve damage, and failure of block. Questions answered. Patient consents. She understands that the backup plan is general anesthesia.   Risks, benefits, and alternatives of MAC discussed with patient. They understand the risks including possible recall of events in the room. They understand there is a possibility that conversion to general anesthesia may be needed. Patient consents.        Postoperative Care  Postoperative pain management:  IV  analgesics, Oral pain medications, Multi-modal analgesia and Peripheral nerve block (Single Shot).      Consents  Anesthetic plan, risks, benefits and alternatives discussed with:  Patient and Spouse..                          .

## 2017-06-26 NOTE — PROGRESS NOTES
" 06/26/17 1417   Quick Adds   Type of Visit Initial PT Evaluation       Present no   Language English   Living Environment   Lives With spouse   Living Arrangements house   Home Accessibility stairs to enter home   Number of Stairs to Enter Home 3   Number of Stairs Within Home 0   Stair Railings at Home (single rail)   Transportation Available family or friend will provide   Living Environment Comment Pt lives in a rambler with her . Has a few stairs to enter but then will remain on 1 level   Self-Care   Dominant Hand right   Usual Activity Tolerance good   Current Activity Tolerance moderate   Regular Exercise yes   Activity/Exercise Type strength training;walking  (Active at the [facility])   Exercise Amount/Frequency 3-5 times/wk   Equipment Currently Used at Home none  (has walker)   Functional Level Prior   Ambulation 0-->independent   Transferring 0-->independent   Toileting 0-->independent   Bathing 0-->independent   Dressing 0-->independent   Eating 0-->independent   Communication 0-->understands/communicates without difficulty   Swallowing 0-->swallows foods/liquids without difficulty   Cognition 0 - no cognition issues reported   Fall history within last six months no   Which of the above functional risks had a recent onset or change? ambulation;transferring   General Information   Onset of Illness/Injury or Date of Surgery - Date 06/26/17   Referring Physician Ford Medina MD   Patient/Family Goals Statement \"To get better and back to day to day activities\"   Pertinent History of Current Problem (include personal factors and/or comorbidities that impact the POC) s/p R TKA   Precautions/Limitations fall precautions   Weight-Bearing Status - LUE full weight-bearing   Weight-Bearing Status - RUE full weight-bearing   Weight-Bearing Status - LLE full weight-bearing   Weight-Bearing Status - RLE weight-bearing as tolerated   General Observations Supine in bed upon arrival, " pleasant and agreeabl   General Info Comments IV, CPM, hemovac   Cognitive Status Examination   Orientation orientation to person, place and time   Level of Consciousness alert   Follows Commands and Answers Questions 100% of the time;able to follow multistep instructions   Personal Safety and Judgment intact   Memory intact   Pain Assessment   Patient Currently in Pain Yes, see Vital Sign flowsheet   Integumentary/Edema   Integumentary/Edema no deficits were identifed   Posture    Posture Not impaired   Range of Motion (ROM)   ROM Comment R knee AAROM=0-4-85. L LE WFL   Strength   Strength Comments Did not formally assess, demonstrates good quality contractions with supine exercises, independent with SLR on R. On R LE not quite able to DF yet, trace   Bed Mobility   Bed Mobility Comments Pt completes supine<>sit transfer with CGA to R LE only   Transfer Skills   Transfer Comments Completes sit<>stand transfer with CGA and use of walker   Gait   Gait Comments Pt takes steps from bed<>commode with CGA and use of walker, mild instability noted   Balance   Balance Comments Supervision level sitting balance, CGA for standing balance with UE support from walker   Sensory Examination   Sensory Perception Comments Light touch sensation grossly intact   General Therapy Interventions   Planned Therapy Interventions balance training;bed mobility training;gait training;ROM;strengthening;transfer training;risk factor education;home program guidelines;progressive activity/exercise   Clinical Impression   Criteria for Skilled Therapeutic Intervention yes, treatment indicated   PT Diagnosis impaired functional mobility   Influenced by the following impairments increased post-op pain, decreased R LE strength and ROM   Functional limitations due to impairments impaired bed mobility, transfers and ambulation   Clinical Presentation Stable/Uncomplicated   Clinical Presentation Rationale s/p R TKA, mobilizing well with minimal  "co-morbidities   Clinical Decision Making (Complexity) Low complexity   Therapy Frequency` 2 times/day   Predicted Duration of Therapy Intervention (days/wks) 4 days   Anticipated Equipment Needs at Discharge (has walker)   Anticipated Discharge Disposition Home with Assist;Home with Home Therapy;Home with Outpatient Therapy  (Home PT vs OP PT, anticipate OP PT)   Risk & Benefits of therapy have been explained Yes   Patient, Family & other staff in agreement with plan of care Yes   [facility] TM \"6 Clicks\"   2016, Trustees of [facility], under license to [employer].  All rights reserved.   6 Clicks Short Forms Basic Mobility Inpatient Short Form   [facility] AM-PAC  \"6 Clicks\" V.2 Basic Mobility Inpatient Short Form   1. Turning from your back to your side while in a flat bed without using bedrails? 4 - None   2. Moving from lying on your back to sitting on the side of a flat bed without using bedrails? 4 - None   3. Moving to and from a bed to a chair (including a wheelchair)? 4 - None   4. Standing up from a chair using your arms (e.g., wheelchair, or bedside chair)? 4 - None   5. To walk in hospital room? 3 - A Little   6. Climbing 3-5 steps with a railing? 3 - A Little   Basic Mobility Raw Score (Score out of 24.Lower scores equate to lower levels of function) 22   Total Evaluation Time   Total Evaluation Time (Minutes) 9     "

## 2017-06-26 NOTE — IP AVS SNAPSHOT
[facility] [address]    [address]    [place] [place] [address]    Phone:  [phone]                                       After Visit Summary   6/26/2017    Mitra Hall    MRN: [id number]           After Visit Summary Signature Page     I have received my discharge instructions, and my questions have been answered. I have discussed any challenges I see with this plan with the nurse or doctor.    ..........................................................................................................................................  Patient/Patient Representative Signature      ..........................................................................................................................................  Patient Representative Print Name and Relationship to Patient    ..................................................               ................................................  Date                                            Time    ..........................................................................................................................................  Reviewed by Signature/Title    ...................................................              ..............................................  Date                                                            Time

## 2017-06-26 NOTE — BRIEF OP NOTE
Diagnosis: tricompartmental OA right knee  Procedure: R TKA  Surgeon: Adam  Assist: Valente  EBL < 50 cc   FR 1000 cc crystalloid  One hemovac drain    Post op Plan: ASA for DVT prophylaxis  Scheduled Tylenol for pain  Hydrocodone for break through pain  toradol for 24 h  celebrex for one month post op

## 2017-06-26 NOTE — ANESTHESIA PROCEDURE NOTES
Peripheral Nerve Block Procedure Note    Staff:     Anesthesiologist:  PILI DALY  Location: Pre-op  Procedure Start/Stop TImes:      6/26/2017 8:48 AM     6/26/2017 8:58 AM    patient identified, IV checked, site marked, risks and benefits discussed, informed consent, monitors and equipment checked, pre-op evaluation, at physician/surgeon's request and post-op pain management      Correct Patient: Yes      Correct Position: Yes      Correct Site: Yes      Correct Procedure: Yes      Correct Laterality:  Yes    Site Marked:  Yes  Procedure details:     Procedure:  Adductor canal    ASA:  2    Laterality:  Right    Position:  Supine    Sterile Prep: chloraprep, mask and sterile gloves      Local skin infiltration:  None    Needle:  Short bevel and insulated    Needle gauge:  21    Needle length (inches):  3.1    Ultrasound: Yes      Ultrasound used to identify targeted nerve, plexus, or vascular structure and placed a needle adjacent to it      Permanent Image entered into patiient's record      Abnormal pain on injection: No      Blood Aspirated: No      Paresthesias:  No    Bleeding at site: No      Bolus via:  Needle    Infusion Method:  Single Shot    Complications:  None  Assessment/Narrative:     Injection made incrementally with aspirations every (mL):  5

## 2017-06-26 NOTE — ANESTHESIA POSTPROCEDURE EVALUATION
Patient: Mitra Hall    Procedure(s):  Optical Tracking System Right Total Knee Arthroplasty - Wound Class: I-Clean    Diagnosis:Osteoarthritis Right Knee   Diagnosis Additional Information: No value filed.    Anesthesia Type:  MAC, Spinal    Note:  Anesthesia Post Evaluation    Patient location during evaluation: PACU  Patient participation: Able to participate in evaluation but full recovery from regional anesthesia has not yet ocurrred but is anticipated to occur within 48 hours  Level of consciousness: awake and alert  Pain management: adequate  Airway patency: patent  Cardiovascular status: acceptable  Respiratory status: acceptable  Hydration status: acceptable  PONV: none     Anesthetic complications: None          Last vitals:  Vitals:    06/26/17 1223 06/26/17 1230 06/26/17 1245   BP:  114/63 122/66   Pulse:      Resp:  11 16   Temp:   36.8  C (98.2  F)   SpO2: 98% 94% 98%         Electronically Signed By: Patsy Fernandez MD  June 26, 2017  1:20 PM

## 2017-06-26 NOTE — CONSULTS
[facility] Internal Medicine Consultation    Mitra Hall MRN# [id number]   Age: 82 year old YOB: 1935   Date of Admission: 6/26/2017     Reason for consult:  Co- Management of post operative state        Requesting physician Ford Medina MD       Level of consult: Consult, follow and place orders           Assessment and Plan:   Assessment:   82 yr old female patient with H/O restless legs, H/O left breast cancer, S/P mastectomy and H/O adenocarcinoma of the colon  ( both cancers treated) , now S/P Rt TAA  Individual problems and their management are outlined below    Plan:  1) S/P Rt TKA:  Management by primary team. Please see their notes for further details  Continue IV   At 50 mls/ hr. Since patient is tolerating diet, will stop IV fluids in 6 hrs  Pain control with Exparel block,  acetaminophen 975 mg every 8 hrs for 3 days and  Oxycodone 5-10 mg every 3 hrs .  DVT prophylaxis with  SCDs while in hospital, 325 mg aspirin X 4 weeks total.  PT/OT as per protocol   Incentive spirometry and aggressive bowel regimen  We will monitor for acute blood loss anemia. Pre op Hgb at 12.6    2) HTN:   Hold home meds of Avalide and resume after BMP is stable on POD 1    3) RLS:   resume home dose of gabapentin 600 mg at bed time and Mirapex    4)Migraine headaches:   PRN Fiorinal and PRN Imitrex    5) IBS:   PRN Dicyclomine and Lomotil             Chief Complaint:   S/P Rt TKA   History is obtained from the patient, pre op physical and timi operative notes .  Mitra Hall is a pleasant 82 year old female patient who underwent the above procedure today. The procedure itself was uneventful with estimated blood loss at 25 mls  Patient received about 1 lts of LR.   Post operatively, she recovered well  She was resting well on 8A. When i went to see her.  She denies chest pain or SOB   Denies fevers or chills  Denies nausea, vomiting or diarrhea  Extensive medication reconciliation done with patient    Able to  tolerate diet and already asking for regular adult diet   at bed side                Past Medical History:     Past Medical History:   Diagnosis Date     Adenocarcinoma, colon (H) 1987    history of     Breast cancer (H) 1998     FH: migraine headache      Irritable bowel syndrome      Osteoarthritis     right knee     Restless leg syndrome              Past Surgical History:     Past Surgical History:   Procedure Laterality Date     BREAST SURGERY Left     biopsy     BREAST SURGERY Left 1998    mastectomy     BREAST SURGERY Right 2000    reduction mammaplasty             Social History:     Social History   Substance Use Topics     Smoking status: Former Smoker     Smokeless tobacco: Not on file     Alcohol use Yes             Family History:   History reviewed. No pertinent family history.          Immunizations:     There is no immunization history on file for this patient.          Allergies:     Allergies   Allergen Reactions     Vicodin [Hydrocodone-Acetaminophen] Nausea and Vomiting             Medications:     Prescriptions Prior to Admission   Medication Sig Dispense Refill Last Dose     pramipexole (MIRAPEX) 0.5 MG tablet Take 0.5 mg by mouth   6/25/2017 at 2000     SUMAtriptan (IMITREX) 25 MG tablet Take 25 mg by mouth at onset of headache for migraine   6/22/2017 at 1500     irbesartan-hydrochlorothiazide (AVALIDE) 300-12.5 MG per tablet Take 1 tablet by mouth daily   6/25/2017 at 0800     acetaminophen (ACETAMINOPHEN 8 HOUR) 650 MG CR tablet Take 650 mg by mouth every 8 hours as needed for mild pain or fever   6/25/2017 at 2200     ASPIRIN PO Take 81 mg by mouth daily   6/16/2017     butalbital-aspirin-caffeine (FIORINAL) -40 MG per capsule Take 1 capsule by mouth 3 times daily as needed for headaches   6/21/2017 at 1500     fluticasone (FLONASE) 50 MCG/ACT spray Spray 2 sprays into both nostrils daily   6/26/2017 at 0800     GABAPENTIN PO Take 800 mg by mouth daily   6/25/2017 at 2200      LORAZEPAM PO Take 0.5 mg by mouth nightly as needed for anxiety   6/25/2017 at 0800     Naproxen Sodium (ALEVE PO) Take 220 mg by mouth 2 times daily (with meals)   6/23/2017 at 0800     OMEPRAZOLE PO Take 20 mg by mouth every morning   6/25/2017 at 0800     PRAMIPEXOLE DIHYDROCHLORIDE PO Take 1 mg by mouth daily   6/25/2017 at 2000     conjugated estrogens (PREMARIN) cream Place vaginally twice a week Uses rarely   Past Week at Unknown time     TRAZODONE HCL PO Take 50 mg by mouth At Bedtime   6/25/2017 at 2200     dicyclomine (BENTYL) 20 MG tablet Take 20 mg by mouth 3 times daily as needed   6/25/2017 at 2000     diphenoxylate-atropine (LOMOTIL) 2.5-0.025 MG per tablet Take 1 tablet by mouth 4 times daily as needed for diarrhea   More than a month at Unknown time             Review of Systems:   A comprehensive review of systems was performed and found to be negative except as described in this note         Physical Exam:   Vitals were reviewed  Vital signs stable   Patient Vitals for the past 8 hrs:   BP Temp Temp src Pulse Heart Rate Resp SpO2   06/26/17 1245 122/66 98.2  F (36.8  C) - - 81 16 98 %   06/26/17 1230 114/63 - - - 74 11 94 %   06/26/17 1223 - - - - - - 98 %   06/26/17 1215 116/65 98.2  F (36.8  C) Axillary - 81 24 96 %   06/26/17 1200 114/64 - - - 80 12 96 %   06/26/17 1140 112/52 98.1  F (36.7  C) Axillary - 89 17 98 %   06/26/17 0855 128/62 - - 64 - 18 -   06/26/17 0850 132/87 - - 68 - 18 -     Constitutional:   awake, alert, cooperative, no apparent distress, and appears stated age     Eyes:   Lids and lashes normal, pupils equal, round and reactive to light, extra ocular muscles intact, sclera clear, conjunctiva normal     ENT:   Normocephalic, without obvious abnormality, atraumatic, sinuses nontender on palpation, external ears without lesions, oral pharynx with moist mucous membranes.     Neck:   Supple, symmetrical, trachea midline, no adenopathy, thyroid symmetric, not enlarged and no  tenderness, skin normal     Lungs:   No increased work of breathing, good air exchange, clear to auscultation bilaterally, no crackles or wheezing     Cardiovascular:   Normal apical impulse, regular rate and rhythm, normal S1 and S2, no S3 or S4, and no murmur noted     Abdomen:   No scars, normal bowel sounds, soft, non-distended, non-tender, no masses palpated, no hepatosplenomegally     Musculoskeletal:   Rt kbnee covered with dressings. No drain. No distal neurovascular deficit      Neurologic:   Awake, alert, oriented to name, place and time.  Cranial nerves II-XII are grossly intact..     Skin:   no bruising or bleeding             Data:     Pre op Labs:   Hgb: 12.6, WBC: 3.8, Plt: 199   na: 139, K: 3.7, Creat: 0.72       .

## 2017-06-26 NOTE — IP AVS SNAPSHOT
MRN:[id number]                      After Visit Summary   6/26/2017    Mitra Hall    MRN: [id number]           Thank you!     Thank you for choosing [facility] [facility] for your care. Our goal is always to provide you with excellent care. Hearing back from our patients is one way we can continue to improve our services. Please take a few minutes to complete the written survey that you may receive in the mail after you visit with us. Thank you!        Patient Information     Date Of Birth          1935        Designated Caregiver       Most Recent Value    Caregiver    Will someone help with your care after discharge? yes    Name of designated caregiver Ziyad    Phone number of caregiver [phone]    Caregiver address        About your hospital stay     You were admitted on:  June 26, 2017 You last received care in the:  [facility] 8A    You were discharged on:  June 29, 2017        Reason for your hospital stay       You had knee surgery.                  Who to Call     For medical emergencies, please call 335.  For non-urgent questions about your medical care, please call your primary care provider or clinic, [phone]  For questions related to your surgery, please call your surgery clinic        Attending Provider     Provider Ford Rodriguez MD Orthopedics       Primary Care Provider Office Phone # Fax #    Chang Simmons [phone]       When to contact your care team       Call your physician for fevers greater than 101.5, chills, increased pain, redness, swelling or discharge at the surgical site. During regular business hours call Dr Medina's  office and request to speak with his nurse or the triage nurses. If you see him at [facility] call [phone]. If you see him at [facility] call [phone]. After hours or on weekends call the hospital  at [phone] and ask to speak with the resident on call.                   After Care Instructions     Activity       Your activity upon discharge: as tolerated. Use CPM, advance as able for 4-6 hours daily. Elevate and ice periodically throughout the day.            Discharge Instructions       Wear TANNER hose on both lower legs. May remove them for hygiene. Continue to wear these until told not to be your physician.            Wound care and dressings       Instructions to care for your wound at home: You have a brown dressing on which should remain in place 5 days. You may shower over this dressing. After 5 days you may remove it. Underneath there may be steri-strips. Leave these in place. You may shower with your wound un covered as long as it is clean and dry. Do not scrub your wound. You may leave your wound uncovered as long as it is clean and dry. Notify your physician if you notice any drainage.                  Follow-up Appointments     Follow Up and recommended labs and tests       Follow up with Dr Medina's team in two weeks for a wound check. If you have not made a follow up appointment please do so.  [facility] [phone]     Email: [email or web address]    Call the clinic if you have not heard about your follow up appointments within 7 days.                  Additional Services     Physical Therapy Referral       [facility]  [address] [place] [id number]    Ph: [phone]  Fax: [phone]    If you have not heard from the scheduling office within 2 business days, please call the number listed above for [facility].    Treatment: Evaluation & Treatment following Right total knee arthroplasty     Please be aware that coverage of these services is subject to the terms and limitations of your health insurance plan.  Call member services at your health plan with any benefit or coverage questions.      **Note to Provider:  If you are referring outside of [facility] for the therapy appointment, please list the name of the location  "in the \"special instructions\" above, print the referral and give to the patient to schedule the appointment.                  Further instructions from your care team       Discharge RN: Please Fax signed AVS to [facility] [place] at  Fax # [phone]    Pending Results     Date and Time Order Name Status Description    2017 1202 US Lower Extremity Venous Duplex Right Preliminary             Statement of Approval     Ordered          17 1529  I have reviewed and agree with all the recommendations and orders detailed in this document.  EFFECTIVE NOW     Comments:  If pt lower extremity ultrasound is negative, she may discharge tonight.   Approved and electronically signed by:  Jalyn Angeles APRN CNP             Admission Information     Date & Time Provider Department Dept. Phone    2017 Ford Medina MD  [phone] [phone]      Your Vitals Were     Blood Pressure Pulse Temperature Respirations Height Weight    119/51 70 98.8  F (37.1  C) 16 1.499 m (4' 11\") 56.7 kg (125 lb)    Pulse Oximetry BMI (Body Mass Index)                95% 25.25 kg/m2          [facility] Information     [employer] lets you send messages to your doctor, view your test results, renew your prescriptions, schedule appointments and more. To sign up, go to [email or web address] . Click on \"Log in\" on the left side of the screen, which will take you to the Welcome page. Then click on \"Sign up Now\" on the right side of the page.     You will be asked to enter the access code listed below, as well as some personal information. Please follow the directions to create your username and password.     Your access code is: [email or web address]  Expires: 2017 12:20 PM     Your access code will  in 90 days. If you need help or a new code, please call your [place] clinic or [phone].        Care EveryWhere ID     This is your Care EveryWhere ID. This could be used by other organizations to access your [place] " medical records  [id number]        Equal Access to Services     SOFIYA JENNIFER : Hadii brent michel luis angel Socristobal, waaxda luqadaha, qaybta kaalexander briangabby, waxridge reyesin hayaanano torressathish velez geovanna patrick. So [facility] [phone].    ATENCIÓN: Si habla español, tiene a mccauley disposición servicios gratuitos de asistencia lingüística. Llame al [phone].    We comply with applicable federal civil rights laws and [place] laws. We do not discriminate on the basis of race, color, national origin, age, disability sex, sexual orientation or gender identity.               Review of your medicines      START taking        Dose / Directions    aspirin - buffered 325 MG Tabs tablet   Commonly known as:  ASCRIPTIN        Dose:  325 mg   Take 1 tablet (325 mg) by mouth daily for 28 days   Quantity:  28 tablet   Refills:  0       oxyCODONE 5 MG IR tablet   Commonly known as:  ROXICODONE        Dose:  2.5-5 mg   Take 0.5-1 tablets (2.5-5 mg) by mouth every 4 hours as needed for moderate to severe pain   Quantity:  50 tablet   Refills:  0       senna-docusate 8.6-50 MG per tablet   Commonly known as:  SENOKOT-S;PERICOLACE        Dose:  1-2 tablet   Take 1-2 tablets by mouth 2 times daily Discontinue when off narcotics.   Quantity:  50 tablet   Refills:  0         CONTINUE these medicines which may have CHANGED, or have new prescriptions. If we are uncertain of the size of tablets/capsules you have at home, strength may be listed as something that might have changed.        Dose / Directions    aspirin 81 MG tablet   This may have changed:    - medication strength  - additional instructions        Dose:  81 mg   Take 1 tablet (81 mg) by mouth daily Hold this while taking 325 mg ASA then resume usual 81 mg daily.   Quantity:  30 tablet   Refills:  0       pramipexole 0.5 MG tablet   Commonly known as:  MIRAPEX   This may have changed:  Another medication with the same name was removed. Continue taking this medication, and follow the  directions you see here.        Dose:  0.5 mg   Take 0.5 mg by mouth   Refills:  0         CONTINUE these medicines which have NOT CHANGED        Dose / Directions    ACETAMINOPHEN 8 HOUR 650 MG CR tablet   Generic drug:  acetaminophen        Dose:  650 mg   Take 650 mg by mouth every 8 hours as needed for mild pain or fever   Refills:  0       butalbital-aspirin-caffeine -40 MG per capsule   Commonly known as:  FIORINAL        Dose:  1 capsule   Take 1 capsule by mouth 3 times daily as needed for headaches   Refills:  0       conjugated estrogens cream   Commonly known as:  PREMARIN        Place vaginally twice a week Uses rarely   Refills:  0       dicyclomine 20 MG tablet   Commonly known as:  BENTYL        Dose:  20 mg   Take 20 mg by mouth 3 times daily as needed   Refills:  0       FLONASE 50 MCG/ACT spray   Generic drug:  fluticasone        Dose:  2 spray   Spray 2 sprays into both nostrils daily   Refills:  0       GABAPENTIN PO        Dose:  800 mg   Take 800 mg by mouth daily   Refills:  0       irbesartan-hydrochlorothiazide 300-12.5 MG per tablet   Commonly known as:  AVALIDE        Dose:  1 tablet   Take 1 tablet by mouth daily   Refills:  0       LOMOTIL 2.5-0.025 MG per tablet   Generic drug:  diphenoxylate-atropine        Dose:  1 tablet   Take 1 tablet by mouth 4 times daily as needed for diarrhea   Refills:  0       LORAZEPAM PO        Dose:  0.5 mg   Take 0.5 mg by mouth nightly as needed for anxiety   Refills:  0       OMEPRAZOLE PO        Dose:  20 mg   Take 20 mg by mouth every morning   Refills:  0       SUMAtriptan 25 MG tablet   Commonly known as:  IMITREX        Dose:  25 mg   Take 25 mg by mouth at onset of headache for migraine   Refills:  0       TRAZODONE HCL PO        Dose:  50 mg   Take 50 mg by mouth At Bedtime   Refills:  0         STOP taking     ALEVE PO                Where to get your medicines      These medications were sent to [facility]  [place] - [address]  [address] [facility] [address], [facility] [address]     Phone:  [phone]     aspirin - buffered 325 MG Tabs tablet    senna-docusate 8.6-50 MG per tablet         Some of these will need a paper prescription and others can be bought over the counter. Ask your nurse if you have questions.     Bring a paper prescription for each of these medications     oxyCODONE 5 MG IR tablet                Protect others around you: Learn how to safely use, store and throw away your medicines at www.disposemymeds.org.             Medication List: This is a list of all your medications and when to take them. Check marks below indicate your daily home schedule. Keep this list as a reference.      Medications           Morning Afternoon Evening Bedtime As Needed    ACETAMINOPHEN 8 HOUR 650 MG CR tablet   Take 650 mg by mouth every 8 hours as needed for mild pain or fever   Generic drug:  acetaminophen                                aspirin - buffered 325 MG Tabs tablet   Commonly known as:  ASCRIPTIN   Take 1 tablet (325 mg) by mouth daily for 28 days   Last time this was given:  325 mg on 6/29/2017  7:59 AM                                aspirin 81 MG tablet   Take 1 tablet (81 mg) by mouth daily Hold this while taking 325 mg ASA then resume usual 81 mg daily.                                butalbital-aspirin-caffeine -40 MG per capsule   Commonly known as:  FIORINAL   Take 1 capsule by mouth 3 times daily as needed for headaches                                conjugated estrogens cream   Commonly known as:  PREMARIN   Place vaginally twice a week Uses rarely                                dicyclomine 20 MG tablet   Commonly known as:  BENTYL   Take 20 mg by mouth 3 times daily as needed                                FLONASE 50 MCG/ACT spray   Spray 2 sprays into both nostrils daily   Last time this was given:  2 sprays on 6/29/2017  8:00 AM   Generic drug:  fluticasone                                 GABAPENTIN PO   Take 800 mg by mouth daily   Last time this was given:  800 mg on 6/28/2017  9:42 PM                                irbesartan-hydrochlorothiazide 300-12.5 MG per tablet   Commonly known as:  AVALIDE   Take 1 tablet by mouth daily                                LOMOTIL 2.5-0.025 MG per tablet   Take 1 tablet by mouth 4 times daily as needed for diarrhea   Generic drug:  diphenoxylate-atropine                                LORAZEPAM PO   Take 0.5 mg by mouth nightly as needed for anxiety                                OMEPRAZOLE PO   Take 20 mg by mouth every morning   Last time this was given:  20 mg on 6/29/2017  7:59 AM                                oxyCODONE 5 MG IR tablet   Commonly known as:  ROXICODONE   Take 0.5-1 tablets (2.5-5 mg) by mouth every 4 hours as needed for moderate to severe pain   Last time this was given:  2.5 mg on 6/29/2017  3:08 AM                                pramipexole 0.5 MG tablet   Commonly known as:  MIRAPEX   Take 0.5 mg by mouth   Last time this was given:  1 mg on 6/28/2017  9:42 PM                                senna-docusate 8.6-50 MG per tablet   Commonly known as:  SENOKOT-S;PERICOLACE   Take 1-2 tablets by mouth 2 times daily Discontinue when off narcotics.   Last time this was given:  2 tablets on 6/29/2017  7:59 AM                                SUMAtriptan 25 MG tablet   Commonly known as:  IMITREX   Take 25 mg by mouth at onset of headache for migraine                                TRAZODONE HCL PO   Take 50 mg by mouth At Bedtime   Last time this was given:  50 mg on 6/28/2017  9:42 PM

## 2017-06-26 NOTE — ANESTHESIA PROCEDURE NOTES
Spinal/LP Procedure Note    Spinal Block  Staff:     Anesthesiologist:  RAUL SAENZ  Location: OR  Procedure Start/Stop Times:      6/26/2017 9:37 AM     6/26/2017 9:50 AM    patient identified, IV checked, site marked, risks and benefits discussed, informed consent, monitors and equipment checked, pre-op evaluation, at physician/surgeon's request and post-op pain management      Correct Patient: Yes      Correct Position: Yes      Correct Site: Yes      Correct Procedure: Yes      Correct Laterality:  N/A    Site Marked:  N/A  Procedure:     Procedure:  Intrathecal    Position:  Sitting    Sterile Prep: chloraprep, mask, sterile gloves and patient draped      Insertion site:  L3-4    Approach:  Midline    Needle Type:  Kajal    Needle gauge (G):  25    Local Skin Infiltration:  1% lidocaine    amount (ml):  3    Needle Length (in):  3    Introducer used: Yes      Attempts:  2    Redirects:  1    CSF:  Clear    Paresthesias:  Resolved (transient left-sided paresthesias during one attempt at L4-5)    Time injected:  09:50  Assessment/Narrative:      First attempt at L4-5, unable to obtain CSF. Moved to L3-4 and was able to obtain CSF.

## 2017-06-26 NOTE — PLAN OF CARE
Problem: Goal Outcome Summary  Goal: Goal Outcome Summary  PT evaluation complete and treatment initiated. Overall doing well on POD #0. Still with trace effects of block but needing to get up to commode. Completes supine<>sit transfer with CGA to R LE only. Sits EOB with good tolerance, VSS. Completes sit<>stand transfer with CGA and use of walker and was able to take steps from bed<>commode, very mild instability noted. Dependent with toilet hygiene. R knee AAROM=0-4-85. Anticipate d/c home with assist and home PT vs OP PT pending transportation. From PT standpoint may be ready POD #2.

## 2017-06-27 ENCOUNTER — APPOINTMENT (OUTPATIENT)
Dept: PHYSICAL THERAPY | Facility: CLINIC | Age: 82
DRG: 470 | End: 2017-06-27
Attending: ORTHOPAEDIC SURGERY
Payer: MEDICARE

## 2017-06-27 ENCOUNTER — APPOINTMENT (OUTPATIENT)
Dept: OCCUPATIONAL THERAPY | Facility: CLINIC | Age: 82
DRG: 470 | End: 2017-06-27
Attending: ORTHOPAEDIC SURGERY
Payer: MEDICARE

## 2017-06-27 LAB
ANION GAP SERPL CALCULATED.3IONS-SCNC: 7 MMOL/L (ref 3–14)
BUN SERPL-MCNC: 15 MG/DL (ref 7–30)
CALCIUM SERPL-MCNC: 8.1 MG/DL (ref 8.5–10.1)
CHLORIDE SERPL-SCNC: 105 MMOL/L (ref 94–109)
CO2 SERPL-SCNC: 28 MMOL/L (ref 20–32)
CREAT SERPL-MCNC: 0.62 MG/DL (ref 0.52–1.04)
GFR SERPL CREATININE-BSD FRML MDRD: ABNORMAL ML/MIN/1.7M2
GLUCOSE SERPL-MCNC: 130 MG/DL (ref 70–99)
HGB BLD-MCNC: 10.4 G/DL (ref 11.7–15.7)
POTASSIUM SERPL-SCNC: 4.5 MMOL/L (ref 3.4–5.3)
SODIUM SERPL-SCNC: 140 MMOL/L (ref 133–144)

## 2017-06-27 PROCEDURE — 25000132 ZZH RX MED GY IP 250 OP 250 PS 637: Mod: GY | Performed by: NURSE PRACTITIONER

## 2017-06-27 PROCEDURE — 25000132 ZZH RX MED GY IP 250 OP 250 PS 637: Mod: GY | Performed by: ORTHOPAEDIC SURGERY

## 2017-06-27 PROCEDURE — 97530 THERAPEUTIC ACTIVITIES: CPT | Mod: GO | Performed by: OCCUPATIONAL THERAPIST

## 2017-06-27 PROCEDURE — 25000128 H RX IP 250 OP 636: Performed by: ORTHOPAEDIC SURGERY

## 2017-06-27 PROCEDURE — A9270 NON-COVERED ITEM OR SERVICE: HCPCS | Mod: GY | Performed by: ORTHOPAEDIC SURGERY

## 2017-06-27 PROCEDURE — A9270 NON-COVERED ITEM OR SERVICE: HCPCS | Mod: GY | Performed by: INTERNAL MEDICINE

## 2017-06-27 PROCEDURE — 97116 GAIT TRAINING THERAPY: CPT | Mod: GP | Performed by: PHYSICAL THERAPIST

## 2017-06-27 PROCEDURE — 99207 ZZC CDG-MDM COMPONENT: MEETS MODERATE - UP CODED: CPT | Performed by: INTERNAL MEDICINE

## 2017-06-27 PROCEDURE — 97110 THERAPEUTIC EXERCISES: CPT | Mod: GP | Performed by: PHYSICAL THERAPIST

## 2017-06-27 PROCEDURE — 97110 THERAPEUTIC EXERCISES: CPT | Mod: GP

## 2017-06-27 PROCEDURE — A9270 NON-COVERED ITEM OR SERVICE: HCPCS | Mod: GY | Performed by: NURSE PRACTITIONER

## 2017-06-27 PROCEDURE — 36415 COLL VENOUS BLD VENIPUNCTURE: CPT | Performed by: ORTHOPAEDIC SURGERY

## 2017-06-27 PROCEDURE — 25000132 ZZH RX MED GY IP 250 OP 250 PS 637: Mod: GY | Performed by: INTERNAL MEDICINE

## 2017-06-27 PROCEDURE — 12000001 ZZH R&B MED SURG/OB UMMC

## 2017-06-27 PROCEDURE — 97530 THERAPEUTIC ACTIVITIES: CPT | Mod: GP

## 2017-06-27 PROCEDURE — 97165 OT EVAL LOW COMPLEX 30 MIN: CPT | Mod: GO | Performed by: OCCUPATIONAL THERAPIST

## 2017-06-27 PROCEDURE — 40000133 ZZH STATISTIC OT WARD VISIT: Performed by: OCCUPATIONAL THERAPIST

## 2017-06-27 PROCEDURE — 80048 BASIC METABOLIC PNL TOTAL CA: CPT | Performed by: ORTHOPAEDIC SURGERY

## 2017-06-27 PROCEDURE — 99232 SBSQ HOSP IP/OBS MODERATE 35: CPT | Performed by: INTERNAL MEDICINE

## 2017-06-27 PROCEDURE — 40000193 ZZH STATISTIC PT WARD VISIT

## 2017-06-27 PROCEDURE — 97530 THERAPEUTIC ACTIVITIES: CPT | Mod: GP | Performed by: PHYSICAL THERAPIST

## 2017-06-27 PROCEDURE — 85018 HEMOGLOBIN: CPT | Performed by: ORTHOPAEDIC SURGERY

## 2017-06-27 PROCEDURE — 40000193 ZZH STATISTIC PT WARD VISIT: Performed by: PHYSICAL THERAPIST

## 2017-06-27 PROCEDURE — 25000128 H RX IP 250 OP 636: Performed by: INTERNAL MEDICINE

## 2017-06-27 RX ORDER — ACETAMINOPHEN 325 MG/1
975 TABLET ORAL EVERY 8 HOURS
Status: DISCONTINUED | OUTPATIENT
Start: 2017-06-27 | End: 2017-06-29 | Stop reason: HOSPADM

## 2017-06-27 RX ORDER — PROCHLORPERAZINE MALEATE 5 MG
5 TABLET ORAL EVERY 6 HOURS PRN
Status: DISCONTINUED | OUTPATIENT
Start: 2017-06-27 | End: 2017-06-29 | Stop reason: HOSPADM

## 2017-06-27 RX ORDER — PROCHLORPERAZINE 25 MG
12.5 SUPPOSITORY, RECTAL RECTAL EVERY 12 HOURS PRN
Status: DISCONTINUED | OUTPATIENT
Start: 2017-06-27 | End: 2017-06-29 | Stop reason: HOSPADM

## 2017-06-27 RX ADMIN — ACETAMINOPHEN 975 MG: 325 TABLET, FILM COATED ORAL at 12:59

## 2017-06-27 RX ADMIN — OXYCODONE HYDROCHLORIDE 5 MG: 5 TABLET ORAL at 01:03

## 2017-06-27 RX ADMIN — Medication 50 MG: at 22:01

## 2017-06-27 RX ADMIN — SENNOSIDES AND DOCUSATE SODIUM 1 TABLET: 8.6; 5 TABLET ORAL at 08:53

## 2017-06-27 RX ADMIN — GABAPENTIN 800 MG: 800 TABLET, FILM COATED ORAL at 21:16

## 2017-06-27 RX ADMIN — ONDANSETRON 4 MG: 2 INJECTION INTRAMUSCULAR; INTRAVENOUS at 11:16

## 2017-06-27 RX ADMIN — FLUTICASONE PROPIONATE 2 SPRAY: 50 SPRAY, METERED NASAL at 08:55

## 2017-06-27 RX ADMIN — PROCHLORPERAZINE EDISYLATE 5 MG: 5 INJECTION INTRAMUSCULAR; INTRAVENOUS at 15:54

## 2017-06-27 RX ADMIN — ASPIRIN 325 MG: 325 TABLET, FILM COATED ORAL at 08:52

## 2017-06-27 RX ADMIN — ACETAMINOPHEN 975 MG: 325 TABLET, FILM COATED ORAL at 21:16

## 2017-06-27 RX ADMIN — CEFAZOLIN 1 G: 1 INJECTION, POWDER, FOR SOLUTION INTRAMUSCULAR; INTRAVENOUS at 02:13

## 2017-06-27 RX ADMIN — PRAMIPEXOLE DIHYDROCHLORIDE 1 MG: 1 TABLET ORAL at 21:16

## 2017-06-27 RX ADMIN — OXYCODONE HYDROCHLORIDE 5 MG: 5 TABLET ORAL at 05:23

## 2017-06-27 RX ADMIN — OXYCODONE HYDROCHLORIDE 5 MG: 5 TABLET ORAL at 21:16

## 2017-06-27 RX ADMIN — SENNOSIDES AND DOCUSATE SODIUM 2 TABLET: 8.6; 5 TABLET ORAL at 21:16

## 2017-06-27 RX ADMIN — OMEPRAZOLE 20 MG: 20 CAPSULE, DELAYED RELEASE ORAL at 08:52

## 2017-06-27 NOTE — PLAN OF CARE
Problem: Goal Outcome Summary  Goal: Goal Outcome Summary  PT: Supine in bed upon arrival, fairly sleepy. Agreeable to participate. Issued and reviewed supine exercises per protocol. R knee AAROM=0-8-82. Agreeable to mobilize. Pt completes supine<>sit transfer with HOB elevated and SBA. Sits EOB initially with fair tolerance. Completes sit<>stand transfer with CGA and use of walker. Only able to stand a few seconds before needing to sit. Pt overall limited pain, nausea and lethargy. Anticipate once feeling better will do well. Continue to anticipate d/c home with assist and OP PT once medically cleared and passed by therapies.

## 2017-06-27 NOTE — PLAN OF CARE
Problem: Goal Outcome Summary  Goal: Goal Outcome Summary  Outcome: Improving  Patient A/Ox4. VSS. Denies CP, SOB, dizziness/LH. LSCTA. +fl/BS. Voiding with out pain or difficulty CMS intact. Dressing to knee CDI, ice applied. Tolerating regular diet, but one episode of emesis when up with PT this morning, medicated with Zofran x one no nausea, IS encouraged. IV SL Pain rated comfortably manageable  throughout shift, Patient has demonstrated ability to call appropriately, on CPM most of the day 0-55 degree flexion, Patient is resting with call light within reach. Will continue to monitor.  Pt had another emesis later on the day, compazine iv ordered and given x one with relief and pt tolerated regular diet, will continue to monitor.

## 2017-06-27 NOTE — OP NOTE
PREOPERATIVE DIAGNOSIS:  Medial compartment osteoarthritis, right knee.      POSTOPERATIVE DIAGNOSIS:  Tricompartmental osteoarthritis, right knee.      PROCEDURES PERFORMED:  Right total knee arthroplasty with computer navigation.      SURGEON:  Ford Medina MD.      FIRST ASSISTANT:  JODI Lawler.  It was medically necessary for a physician assistant to assist in the surgical care of this patient for the purposes of soft tissue protection, limb manipulation and instrumentation assistance.      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  Risks, goals and options were discussed on unicompartmental versus total knee arthroplasty.  She understood and she wished to proceed.      DESCRIPTION OF PROCEDURE:  Under subarachnoid block anesthesia in the supine position, the right knee was prepped and draped in the usual sterile fashion.  Pause for the cause occurred and all present were in agreement.  Limb was exsanguinated and inflated to 300 mmHg.  We used a 10 cm anteromedial incision with a median parapatellar arthrotomy and we found advanced osteoarthritic change in the medial compartment, as expected.  We found also advanced changes in the lateral compartment with eburnation and exposed bone on the femoral and tibial chondral surfaces.  We switched our surgical plan to a total knee arthroplasty and proceeded with computer navigation.  We advanced the incision 4 cm and we noted abnormal kinematic curves with motion 0-130 degrees.  We did the distal femoral resection and the proximal tibial resection under navigation with the 4-in-1 cutting block on the femur, sizing the femur and the tibia as a size 3.  We cut appropriately with no notching in the femur and preserved the posterior cruciate ligament.  We removed the medial and lateral menisci and injected the posterior capsule.  We trialled a #3 [place] femur and a #3 [place] tibia with a 9  mm insert and found ideal range of motion, stability and improvement in kinematic curves.  There was slight additional laxity in the knee, very subtle, with an intact posterior cruciate ligament.  We chose to use an anterior lipped tibial prosthesis.  We irrigated copiously.  We prepared the femur with 2 lug holes and the tibia with a keel and then we sterilely dried the surfaces and used a single batch of tobramycin impregnated cement, cementing tibia, femur and patella in that order  after having resected 9 mm of patella for an asymmetric 9 mm x 29 mm patellar button.  We held all surfaces in compression until polymerization was complete and removed any excess cement.  We irrigated once again and closed in layers over a deep Hemovac drain with complete closure, compression bandage and the patient returning to recovery in good condition.  There were no operative complications noted.         JACKI SIDDIQI MD             D: 2017 11:11   T: 2017 22:18   MT:       Name:     EZEKIEL OLIVEIRA   MRN:      [id number]        Account:        [id number]   :      1935           Procedure Date: 2017      Document: [id number]

## 2017-06-27 NOTE — PLAN OF CARE
"Problem: Goal Outcome Summary  Goal: Goal Outcome Summary  OT: Poor participation today. Pt agitated about people \"continuously coming in\". Pt agreed to ambulate to the commode overlay in the bathroom, however, upon standing with SBA and FWW, Pt became agitated and requested to lay back down.       "

## 2017-06-27 NOTE — PLAN OF CARE
Problem: Goal Outcome Summary  Goal: Goal Outcome Summary  Outcome: Improving            VS:    WDL   Output:    Up to BSC, voiding, PVR's decreasing   Activity:    Up with FWW   Skin: WDL except incision   Pain:    Oxycodone 5mg every 3 hours   CMS:    Numbness on RLE   Dressing:    Ace wrap on R knee   Diet:    Reg   LDA:    HV, not much output overnight   Equipment:    Walker   Plan:    Home w assist 1-2 days   Additional Info:

## 2017-06-27 NOTE — PLAN OF CARE
Problem: Goal Outcome Summary  Goal: Goal Outcome Summary  Outcome: Improving  Pt arrived to unit at 1330 and was oriented to room and call light  VS:    VS and capnography stable on 2L   Output:    Adequate output via BSC. Continue to monitor PVRs; see flowsheet. Last . LBM 6/26   Activity:    WBAT; Up with 1A with FWW   Skin: Intact except for incision.   Pain:    Tolerable with 5 mg oxycodone q3-4h   CMS:    Intact    Dressing:    ACE CDI   Diet:    Tolerating regular diet   LDA:    R hand PIV SL. HV patent with 120cc output.   Equipment:    CPM on throughout shift. FWW, PCDs   Plan:    Home with A and home v OP PT probably POD2   Additional Info:    Pt received spinal and exparel block. EBL 25. Worked with PT today.

## 2017-06-27 NOTE — PLAN OF CARE
"Problem: Goal Outcome Summary  Goal: Goal Outcome Summary  OT: Upon arrival, Pt said that she was \"too tired\" and that she \"can't absorb anything right now\". Checking back with her later this morning.      "

## 2017-06-27 NOTE — PROGRESS NOTES
"[facility], [place]   Internal Medicine Daily Note           Interval History/Events     Hand off taken from Dr. Fraga  Overnight events reviewed  Reports pain is controlled  Feels little sleepy  Episode of emesis earlier this morning  Improved now  No abdominal pain  No fever, chills         Review of Systems        4 point ROS including Respiratory, CV, GI and , other than that noted above is negative      Medications   I have reviewed current medications  in the \"current medication\" section of Epic.  Relevant changes include:     Physical Exam   General:       Vital signs:    Blood pressure 91/41, pulse 56, temperature 97.7  F (36.5  C), temperature source Axillary, resp. rate 17, height 1.499 m (4' 11\"), weight 56.7 kg (125 lb), SpO2 95 %.  Estimated body mass index is 25.25 kg/(m^2) as calculated from the following:    Height as of this encounter: 1.499 m (4' 11\").    Weight as of this encounter: 56.7 kg (125 lb).      Intake/Output Summary (Last 24 hours) at 06/27/17 1611  Last data filed at 06/27/17 1530   Gross per 24 hour   Intake                0 ml   Output             1675 ml   Net            -1675 ml      HEENT: No icterus, no pallor  Cardiovascular: S1, S2 normal.   Respiratory:  B/L CTA  GI/Abdomen: Soft, NT, BS+  Neurology: Falls asleep intermittently. Easily awakes to voice. No focal deficit. No tremors.   Extremities: Right lower extremity dressing in place.   Skin:      Laboratory and Imaging Studies     I have reviewed  laboratory and imaging studies in the Epic. Pertinent findings are as below:    BMP  Recent Labs  Lab 06/27/17  0520      POTASSIUM 4.5   CHLORIDE 105   FRANCISCO 8.1*   CO2 28   BUN 15   CR 0.62   *     CBC  Recent Labs  Lab 06/27/17  0520   HGB 10.4*     INRNo lab results found in last 7 days.  LFTsNo lab results found in last 7 days.   PANCNo lab results found in last 7 days.        Impression/Plan      82 yr old female patient with H/O " restless legs, H/O left breast cancer, S/P mastectomy and H/O adenocarcinoma of the colon  ( both cancers treated) , now S/P Rt TAA    # S/P Rt TKA on 06/26  Management by primary team. Please see their notes for further details  Pain control with Exparel block,  acetaminophen 975 mg every 8 hrs for 3 days and  Oxycodone 2.5-5 mg every 4 hours. Dose increased due to sleepiness  DVT prophylaxis with  SCDs while in hospital, 325 mg aspirin X 4 weeks total.  PT/OT as per protocol   Incentive spirometry and aggressive bowel regimen  Monitor for acute blood loss anemia      # Anemia: Most likely due to acute blood loss. Baseline Hg unknow  Repeat Hg in AM  Transfuse if Hg < 7 gm/dll     # Post operative nausea, vomiting: On  Ondansetron PRN  Will add Prochlorperazine as need.   - Continue Ondanetron    # Hx of  HTN: PTA on Irbesartan-HCTZ  BP on the low normal side  - Continue to hold Irbesartan- HCTZ    #  RLS: PTA on Gabapentin, and Pramipexole  - Continue    # Migraine headaches: PTA on Sumatriptan PRN and Aspirin-butalbital-caffein PRN  - Continue     #  IBS: PRN Dicyclomine and Lomotil     Pt's care was discussed with bedside RN, patient and  during Care Team Rounds.               Paulo Simental MD  Hospitalist ( Internal medicine)  Pager: [phone]

## 2017-06-27 NOTE — PROGRESS NOTES
06/27/17 1003   Quick Adds   Type of Visit Initial Occupational Therapy Evaluation   Living Environment   Lives With spouse   Living Arrangements house   Home Accessibility bed and bath on same level   Number of Stairs to Enter Home 3   Number of Stairs Within Home 0   Transportation Available car;family or friend will provide   Living Environment Comment walk-in shower; raised toilet; grab bars by toilet and in shower; shower chair   Self-Care   Dominant Hand right   Usual Activity Tolerance good   Current Activity Tolerance fair   Regular Exercise yes   Activity/Exercise Type walking;other (see comments)  (machines at the [facility])   Exercise Amount/Frequency 3-5 times/wk   Equipment Currently Used at Home shower chair;raised toilet   Activity/Exercise/Self-Care Comment Patient independent with ADLs without AE.   Functional Level Prior   Ambulation 0-->independent   Transferring 0-->independent   Toileting 0-->independent   Bathing 0-->independent   Dressing 0-->independent   Eating 0-->independent   Communication 0-->understands/communicates without difficulty   Swallowing 0-->swallows foods/liquids without difficulty   Cognition 0 - no cognition issues reported   Fall history within last six months no   Which of the above functional risks had a recent onset or change? ambulation;transferring   Prior Functional Level Comment Patient independent with ADLs without AE.   General Information   Onset of Illness/Injury or Date of Surgery - Date 06/26/17   Referring Physician Dr. Medina   Patient/Family Goals Statement Return home to baseline   Additional Occupational Profile Info/Pertinent History of Current Problem POD #1 s/p R TKA   Precautions/Limitations no known precautions/limitations   Weight-Bearing Status - LLE weight-bearing as tolerated   Weight-Bearing Status - RLE weight-bearing as tolerated   Cognitive Status Examination   Orientation orientation to person, place and time   Level of Consciousness  lethargic/somnolent   Sensory Examination   Sensory Comments Numbness in foot, no tingling   Pain Assessment   Patient Currently in Pain No   Mobility   Bed Mobility Bed mobility skill: Sit to supine;Bed mobility skill: Supine to sit   Bed Mobility Skill: Sit to Supine   Level of [place]: Sit/Supine stand-by assist   Bed Mobility Skill: Supine to Sit   Level of [place]: Supine/Sit stand-by assist   Transfer Skill: Sit to Stand   Level of [place]: Sit/Stand stand-by assist   Physical Assist/Nonphysical Assist: Sit/Stand supervision   Transfer Skill: Sit to Stand weight-bearing as tolerated   Assistive Device for Transfer: Sit/Stand rolling walker   Transfer Skill: Toilet Transfer   Level of [place]: Toilet other (see comments)  (Not assessed)   Tub/Shower Transfer   Tub/Shower Transfer Transfer Skill: Tub/Shower Transfers   Transfer Skill: Tub/Shower Transfer   Level of [place]: Tub/Shower other (see comments)  (Not assessed)   Upper Body Dressing   Level of [place]: Dress Upper Body independent   Lower Body Dressing   Level of [place]: Dress Lower Body other (see comments)  (not assessed)   Grooming   Level of [place]: Grooming independent   Eating/Self Feeding   Level of [place]: Eating independent   General Therapy Interventions   Planned Therapy Interventions ADL retraining   Intervention Comments LB dressing, toileting, bathing   Clinical Impression   Criteria for Skilled Therapeutic Interventions Met yes, treatment indicated   OT Diagnosis Decreased independence in ADLs/functional mobility   Influenced by the following impairments Pain, decreased ROM, decreased strength   Assessment of Occupational Performance 1-3 Performance Deficits   Identified Performance Deficits LB dressing, toileting, and bathing.   Clinical Decision Making (Complexity) Low complexity   Therapy Frequency daily   Predicted Duration of Therapy Intervention (days/wks) 2-3 days   Anticipated  "Discharge Disposition Home with Assist   Risks and Benefits of Treatment have been explained. Yes   Patient, Family & other staff in agreement with plan of care Yes   [facility] [place] TM \"6 Clicks\"   2016, Trustees of [facility], under license to [employer].  All rights reserved.   6 Clicks Short Forms Daily Activity Inpatient Short Form   [facility]  \"6 Clicks\" Daily Activity Inpatient Short Form   1. Putting on and taking off regular lower body clothing? 3 - A Little   2. Bathing (including washing, rinsing, drying)? 3 - A Little   3. Toileting, which includes using toilet, bedpan or urinal? 3 - A Little   4. Putting on and taking off regular upper body clothing? 4 - None   5. Taking care of personal grooming such as brushing teeth? 4 - None   6. Eating meals? 4 - None   Daily Activity Raw Score (Score out of 24.Lower scores equate to lower levels of function) 21   Total Evaluation Time   Total Evaluation Time (Minutes) 8     "

## 2017-06-27 NOTE — PROGRESS NOTES
"Orthopaedic Surgery Progress Note     Dx: Medial compartment osteoarthritis, right knee.   Tx:  Right total knee arthroplasty with computer navigation.     E: No acute events overnight.    S: Pain well controlled,  Note light numbness over anterior foot..  O:   /51 (BP Location: Right arm)  Pulse 68  Temp 97.2  F (36.2  C) (Oral)  Resp 16  Ht 1.499 m (4' 11\")  Wt 56.7 kg (125 lb)  SpO2 97%  BMI 25.25 kg/m2    Drain: 120/5    Exam:  Gen: NAD, A/O x 3  Resp: Comfortable, non-labored breathing   Abd: soft, bs +  -Wound dressed, c/d/i  -Sens: SILT dp/sp/s/s/t numbness over anterior foot, side and between toes intact.  -Motor: 5/5 EHL/FHL/TA/GSc- plantar extension intact,  Dorsiflexion weak.  -Vasc: 2+ pulses, wwp, brisk cap refill      Recent Labs  Lab 06/27/17  0520      POTASSIUM 4.5   CHLORIDE 105   CO2 28   BUN 15   CR 0.62   *       Recent Labs  Lab 06/27/17  0520   HGB 10.4*          Impression: 82 year old female s/p Right total knee arthroplasty with computer navigation.   on 6- doing well    Plan:  - Activity: up as tolerated.  - Antibiotics: complete  - Diet: as tolerated  - DVT prophylaxis:  mg, po daily x 4 weeks.- Wound Care: Aquacel, change on POD#7. May shower with dressing on.  - Drain: d/c   - Pain management: PNB/oral   - Physical Therapy: s/p TKA  - Occupational Therapy: S/P TKA   - Dispo: Lives with . Anticipates d/c to home.  - Follow up: 2 weeks/ with Dr Oliver's team at [facility] [place].   [facility] [phone]  Email: [email or web address]    Call the clinic if you have not heard about your follow up appointments within 7 days.         Future Appointments  Date Time Provider Department Center   6/27/2017 11:15 AM Jessica Benavides PTA [facility]   6/27/2017 3:30 PM Jessica Benavides PTA [facility] Marcy J Bents, APRN, CNP  Department of Orthopedic Surgery  [facility]  [phone]    For any questions regarding " this patient please page me at the above number prior to contacting the ortho resident on call.    Discussed with Dr Medina.

## 2017-06-28 ENCOUNTER — APPOINTMENT (OUTPATIENT)
Dept: OCCUPATIONAL THERAPY | Facility: CLINIC | Age: 82
DRG: 470 | End: 2017-06-28
Attending: ORTHOPAEDIC SURGERY
Payer: MEDICARE

## 2017-06-28 ENCOUNTER — APPOINTMENT (OUTPATIENT)
Dept: PHYSICAL THERAPY | Facility: CLINIC | Age: 82
DRG: 470 | End: 2017-06-28
Attending: ORTHOPAEDIC SURGERY
Payer: MEDICARE

## 2017-06-28 LAB — HGB BLD-MCNC: 9.6 G/DL (ref 11.7–15.7)

## 2017-06-28 PROCEDURE — 40000133 ZZH STATISTIC OT WARD VISIT: Performed by: OCCUPATIONAL THERAPIST

## 2017-06-28 PROCEDURE — 97110 THERAPEUTIC EXERCISES: CPT | Mod: GP | Performed by: PHYSICAL THERAPIST

## 2017-06-28 PROCEDURE — 97535 SELF CARE MNGMENT TRAINING: CPT | Mod: GO | Performed by: OCCUPATIONAL THERAPIST

## 2017-06-28 PROCEDURE — 36415 COLL VENOUS BLD VENIPUNCTURE: CPT | Performed by: ORTHOPAEDIC SURGERY

## 2017-06-28 PROCEDURE — 97116 GAIT TRAINING THERAPY: CPT | Mod: GP | Performed by: PHYSICAL THERAPIST

## 2017-06-28 PROCEDURE — A9270 NON-COVERED ITEM OR SERVICE: HCPCS | Mod: GY | Performed by: ORTHOPAEDIC SURGERY

## 2017-06-28 PROCEDURE — 40000193 ZZH STATISTIC PT WARD VISIT: Performed by: PHYSICAL THERAPIST

## 2017-06-28 PROCEDURE — 12000001 ZZH R&B MED SURG/OB UMMC

## 2017-06-28 PROCEDURE — 99231 SBSQ HOSP IP/OBS SF/LOW 25: CPT | Performed by: INTERNAL MEDICINE

## 2017-06-28 PROCEDURE — 97530 THERAPEUTIC ACTIVITIES: CPT | Mod: GP | Performed by: PHYSICAL THERAPIST

## 2017-06-28 PROCEDURE — 25000132 ZZH RX MED GY IP 250 OP 250 PS 637: Mod: GY | Performed by: NURSE PRACTITIONER

## 2017-06-28 PROCEDURE — A9270 NON-COVERED ITEM OR SERVICE: HCPCS | Mod: GY | Performed by: INTERNAL MEDICINE

## 2017-06-28 PROCEDURE — A9270 NON-COVERED ITEM OR SERVICE: HCPCS | Mod: GY | Performed by: NURSE PRACTITIONER

## 2017-06-28 PROCEDURE — 25000132 ZZH RX MED GY IP 250 OP 250 PS 637: Mod: GY | Performed by: ORTHOPAEDIC SURGERY

## 2017-06-28 PROCEDURE — 85018 HEMOGLOBIN: CPT | Performed by: ORTHOPAEDIC SURGERY

## 2017-06-28 PROCEDURE — 25000132 ZZH RX MED GY IP 250 OP 250 PS 637: Mod: GY | Performed by: INTERNAL MEDICINE

## 2017-06-28 RX ORDER — AMOXICILLIN 250 MG
1-2 CAPSULE ORAL 2 TIMES DAILY
Qty: 50 TABLET | Refills: 0 | Status: SHIPPED | OUTPATIENT
Start: 2017-06-28 | End: 2017-11-08

## 2017-06-28 RX ORDER — ASPIRIN 325 MG/1
325 TABLET, FILM COATED ORAL DAILY
Qty: 28 TABLET | Refills: 0 | Status: SHIPPED | OUTPATIENT
Start: 2017-06-28 | End: 2017-07-26

## 2017-06-28 RX ORDER — OXYCODONE HYDROCHLORIDE 5 MG/1
2.5-5 TABLET ORAL EVERY 4 HOURS PRN
Qty: 50 TABLET | Refills: 0 | Status: SHIPPED | OUTPATIENT
Start: 2017-06-28 | End: 2017-11-08

## 2017-06-28 RX ADMIN — FLUTICASONE PROPIONATE 2 SPRAY: 50 SPRAY, METERED NASAL at 08:43

## 2017-06-28 RX ADMIN — OXYCODONE HYDROCHLORIDE 5 MG: 5 TABLET ORAL at 01:04

## 2017-06-28 RX ADMIN — OMEPRAZOLE 20 MG: 20 CAPSULE, DELAYED RELEASE ORAL at 08:42

## 2017-06-28 RX ADMIN — Medication 50 MG: at 21:42

## 2017-06-28 RX ADMIN — ASPIRIN 325 MG: 325 TABLET, FILM COATED ORAL at 08:42

## 2017-06-28 RX ADMIN — ACETAMINOPHEN 975 MG: 325 TABLET, FILM COATED ORAL at 08:42

## 2017-06-28 RX ADMIN — ACETAMINOPHEN 975 MG: 325 TABLET, FILM COATED ORAL at 17:10

## 2017-06-28 RX ADMIN — SENNOSIDES AND DOCUSATE SODIUM 2 TABLET: 8.6; 5 TABLET ORAL at 08:42

## 2017-06-28 RX ADMIN — PRAMIPEXOLE DIHYDROCHLORIDE 1 MG: 1 TABLET ORAL at 21:42

## 2017-06-28 RX ADMIN — OXYCODONE HYDROCHLORIDE 2.5 MG: 5 TABLET ORAL at 15:07

## 2017-06-28 RX ADMIN — SENNOSIDES AND DOCUSATE SODIUM 1 TABLET: 8.6; 5 TABLET ORAL at 21:42

## 2017-06-28 RX ADMIN — GABAPENTIN 800 MG: 800 TABLET, FILM COATED ORAL at 21:42

## 2017-06-28 RX ADMIN — OXYCODONE HYDROCHLORIDE 2.5 MG: 5 TABLET ORAL at 21:42

## 2017-06-28 NOTE — PROGRESS NOTES
"Orthopaedic Surgery Progress Note     Dx: Medial compartment osteoarthritis, right knee.   Tx:  Right total knee arthroplasty with computer navigation.     E: No acute events overnight.    S: Pain well controlled, denies  Numbness/tingling. Had nausea yesterday which resolved with compazine.  O:   BP 94/52  Pulse 66  Temp 99  F (37.2  C)  Resp 16  Ht 1.499 m (4' 11\")  Wt 56.7 kg (125 lb)  SpO2 96%  BMI 25.25 kg/m2 no bm yet     Exam:  Gen: NAD, A/O x 3  Resp: Comfortable, non-labored breathing   Abd: soft, bs +/  -Wound dressed, c/d/i  -Sens: SILT dp/sp/s/s/t numbness over anterior foot, side and between toes intact.  -Motor: 5/5 EHL/FHL/TA/GSc-   -Vasc: 2+ pulses, wwp, brisk cap refill      Recent Labs  Lab 06/27/17  0520      POTASSIUM 4.5   CHLORIDE 105   CO2 28   BUN 15   CR 0.62   *       Recent Labs  Lab 06/28/17  0716 06/27/17  0520   HGB 9.6* 10.4*          Impression: 82 year old female s/p Right total knee arthroplasty with computer navigation.   on 6- doing well    Plan:  - Activity: up as tolerated.  - Antibiotics: complete  - Diet: as tolerated  - DVT prophylaxis:  mg, po daily x 4 weeks.- Wound Care: Aquacel, change on POD#7. May shower with dressing on.  - Drain: d/c   - Pain management: PNB/oral   - Physical Therapy: s/p TKA  - Occupational Therapy: S/P TKA   - Dispo: Lives with . Anticipates d/c to home.POD 2-3.  - Follow up: 2 weeks/ with Dr Oliver's team at [facility] [place].   [facility] orthopedic [facility] [phone]  Email: [email or web address]    Call the clinic if you have not heard about your follow up appointments within 7 days.         Future Appointments  Date Time Provider Department Center   6/27/2017 11:15 AM Jessica Benavides PTA [facility]   6/27/2017 3:30 PM Jessica Benavides PTA [facility] Marcy J Bents, APRN, [facility]  Department of Orthopedic Surgery  [facility]  [phone]    For any questions regarding this patient please " page me at the above number prior to contacting the ortho resident on call.    Discussed with Dr Medina.

## 2017-06-28 NOTE — PLAN OF CARE
Problem: Goal Outcome Summary  Goal: Goal Outcome Summary  OT: Recommend DC home with assist, anticipate one more session. Pt sup<>sit with SBA. Pt ambulated approx 15' bed<>bathroom. Pt completed toilet transfer/toilet hygiene with SBA and FWW for standing. Pt donned UB and LB clothing with SBA, using FWW to stand. Pt completed g/h tasks standing at the sink for approx 3 minutes with SBA and FWW.   Patient has roll-in shower, shower chair, RTS, grab bars in toilet and shower.

## 2017-06-28 NOTE — PROGRESS NOTES
"[facility], [place]   Internal Medicine Daily Note           Interval History/Events     Overnight events reviewed  Pain controlled.   Had breakfast today morning, and tolerated diet well  Reported more pain at night than the day  No fever, chills, nausea, vomiting, chest pain, shortness of breath         Review of Systems        4 point ROS including Respiratory, CV, GI and , other than that noted above is negative      Medications   I have reviewed current medications  in the \"current medication\" section of Epic.  Relevant changes include:     Physical Exam   General:       Vital signs:    Blood pressure 94/52, pulse 66, temperature 99  F (37.2  C), resp. rate 16, height 1.499 m (4' 11\"), weight 56.7 kg (125 lb), SpO2 96 %.  Estimated body mass index is 25.25 kg/(m^2) as calculated from the following:    Height as of this encounter: 1.499 m (4' 11\").    Weight as of this encounter: 56.7 kg (125 lb).      Intake/Output Summary (Last 24 hours) at 06/27/17 1611  Last data filed at 06/27/17 1530   Gross per 24 hour   Intake                0 ml   Output             1675 ml   Net            -1675 ml      HEENT: No icterus, no pallor  Cardiovascular: S1, S2 normal.   Respiratory:  B/L CTA  GI/Abdomen: Soft, NT, BS+  Neurology: Falls asleep intermittently. Easily awakes to voice. No focal deficit. No tremors.   Extremities: Right lower extremity dressing in place.        Laboratory and Imaging Studies     I have reviewed  laboratory and imaging studies in the Epic. Pertinent findings are as below:    BMP    Recent Labs  Lab 06/27/17  0520      POTASSIUM 4.5   CHLORIDE 105   FRANCISCO 8.1*   CO2 28   BUN 15   CR 0.62   *     CBC    Recent Labs  Lab 06/28/17  0716   HGB 9.6*     INRNo lab results found in last 7 days.  LFTsNo lab results found in last 7 days.   PANCNo lab results found in last 7 days.        Impression/Plan      82 yr old female patient with H/O restless legs, H/O left " breast cancer, S/P mastectomy and H/O adenocarcinoma of the colon  ( both cancers treated) , now S/P Rt TAA    # S/P Rt TKA on 06/26  Management primarily per Ortho team.  - Wound cares, Dressings, Surgical pain management, DVT Prophylaxis  per primary team.   - Encourage Incentive spirometry  - Laxatives for constipation prophylaxis     # Anemia: Most likely due to Hemodilution, blood loss, and post surgical inflammation.  Hg 9.6 from 10.4    - Transfuse if Hg < 7 gm/dl        # Post operative nausea, vomiting: On  Ondansetron PRN, and Prochlorperazine PRN  - Continue Ondanetron    # Hx of  HTN: PTA on Irbesartan-HCTZ  BP on the low normal side  - Continue to hold Irbesartan- HCTZ    #  RLS: PTA on Gabapentin, and Pramipexole  - Continue    # Migraine headaches: PTA on Sumatriptan PRN and Aspirin-butalbital-caffein PRN  - Continue     #  IBS: PRN Dicyclomine and Lomotil     Pt's care was discussed with bedside RN, patient and  during Care Team Rounds.               Paulo Simental MD  Hospitalist ( Internal medicine)  Pager: [phone]

## 2017-06-28 NOTE — PLAN OF CARE
Problem: Goal Outcome Summary  Goal: Goal Outcome Summary  Outcome: Therapy, progress toward functional goals as expected  Supine to/from sitting with HOB elevated, bed rail, and mod I.   Sit to/from standing from varying surface heights, FWW, and CGA x 1.  Pt ambulated approximately 150' with FWW and CGA to SBA x 1.  Pt declined stairs this PM secondary to pain.  Pt tolerated knee ROM 0-8-85 degrees. Recommend discharge home with spouse for assistance and OP PT.

## 2017-06-28 NOTE — PLAN OF CARE
Problem: Goal Outcome Summary  Goal: Goal Outcome Summary  Outcome: Improving  A/Ox's 4. Pain rated comfortably manageable oxycodone po PRN and scheduled tylenol given for pain control. Dressing CDI. CMS intact. Tolerated regular diet. Denied any nausea, CP, SOB, lightheadedness or dizziness. Voiding without pain or difficulty. Passing flatus had small BM today, Up with SBA and walker Encouraged increased/continued IS use. Bilateral heels elevated of bed. Ice maintained to knee resting in bed at this time with call light in reach. Able to make needs known. Continue to monitor.

## 2017-06-28 NOTE — PROGRESS NOTES
Care Coordinator Progress Note     Admission Date/Time:  6/26/2017  Attending MD:  Ford Medina MD     Data  Chart reviewed, discussed with interdisciplinary team.   Patient was admitted for: Hx of total knee arthroplasty, right.    Concerns with insurance coverage for discharge needs: None.  Current Living Situation: Patient lives with spouse.  Support System: Supportive  Services Involved: None at admission  Transportation: Family or Friend will provide  Barriers to Discharge: medical plan of care    Coordination of Care and Referrals: Provided patient/family with options for Outpatient Rehab.        Assessment  82 year old female s/p Right total knee arthroplasty with possible discharge home tomorrow if she continues to progress.    I met with the patient at the bedside to introduce the role of the care coordinator and to assess for discharge needs. Therapy and Ortho recommend outpatient PT: I spoke to the patient and she would like to go to [facility] in [place]. I spoke to Syl at [facility] (Ph: [phone] Fax: [phone]) to make a referral. Per Syl, this patient is already in their system. I faxed a preliminary OP PT order to [facility], signed orders will be faxed on the day of discharge.    RN CC will continue to follow and assist with DC planning as needed.    Plan  Anticipated Discharge Date:  6/29/17  Anticipated Discharge Plan:  Home with assist and OP PT and follow up    Vannesa Richardson RN Care Coordinator [address]    Ph: [phone]

## 2017-06-28 NOTE — PLAN OF CARE
Problem: Goal Outcome Summary  Goal: Goal Outcome Summary  Outcome: No Change  A/Ox4, VSS, LCTA, BS active, voiding adequate amounts in bedside commode with asst x1 and walker, Dressing CDI, CMS intact. CPM on most of the night. Patient Denies- Chest Pain, SOB, Nausea, Calf Tenderness and Dizziness.  Patient denies numbness and tingling in all extremities. Patient is able to make needs known and call light is within reach. Will continue to monitor.

## 2017-06-29 ENCOUNTER — RECORDS - HEALTHEAST (OUTPATIENT)
Dept: ADMINISTRATIVE | Facility: OTHER | Age: 82
End: 2017-06-29

## 2017-06-29 ENCOUNTER — APPOINTMENT (OUTPATIENT)
Dept: PHYSICAL THERAPY | Facility: CLINIC | Age: 82
DRG: 470 | End: 2017-06-29
Attending: ORTHOPAEDIC SURGERY
Payer: MEDICARE

## 2017-06-29 ENCOUNTER — APPOINTMENT (OUTPATIENT)
Dept: ULTRASOUND IMAGING | Facility: CLINIC | Age: 82
DRG: 470 | End: 2017-06-29
Attending: NURSE PRACTITIONER
Payer: MEDICARE

## 2017-06-29 ENCOUNTER — APPOINTMENT (OUTPATIENT)
Dept: OCCUPATIONAL THERAPY | Facility: CLINIC | Age: 82
DRG: 470 | End: 2017-06-29
Attending: ORTHOPAEDIC SURGERY
Payer: MEDICARE

## 2017-06-29 VITALS
TEMPERATURE: 98.8 F | DIASTOLIC BLOOD PRESSURE: 51 MMHG | RESPIRATION RATE: 16 BRPM | HEART RATE: 70 BPM | OXYGEN SATURATION: 95 % | BODY MASS INDEX: 25.2 KG/M2 | SYSTOLIC BLOOD PRESSURE: 119 MMHG | HEIGHT: 59 IN | WEIGHT: 125 LBS

## 2017-06-29 PROCEDURE — 97535 SELF CARE MNGMENT TRAINING: CPT | Mod: GO | Performed by: OCCUPATIONAL THERAPIST

## 2017-06-29 PROCEDURE — 97116 GAIT TRAINING THERAPY: CPT | Mod: GP | Performed by: PHYSICAL THERAPIST

## 2017-06-29 PROCEDURE — 93971 EXTREMITY STUDY: CPT | Mod: RT

## 2017-06-29 PROCEDURE — A9270 NON-COVERED ITEM OR SERVICE: HCPCS | Mod: GY | Performed by: ORTHOPAEDIC SURGERY

## 2017-06-29 PROCEDURE — 25000132 ZZH RX MED GY IP 250 OP 250 PS 637: Mod: GY | Performed by: ORTHOPAEDIC SURGERY

## 2017-06-29 PROCEDURE — 99231 SBSQ HOSP IP/OBS SF/LOW 25: CPT | Performed by: INTERNAL MEDICINE

## 2017-06-29 PROCEDURE — A9270 NON-COVERED ITEM OR SERVICE: HCPCS | Mod: GY | Performed by: NURSE PRACTITIONER

## 2017-06-29 PROCEDURE — 25000132 ZZH RX MED GY IP 250 OP 250 PS 637: Mod: GY | Performed by: INTERNAL MEDICINE

## 2017-06-29 PROCEDURE — A9270 NON-COVERED ITEM OR SERVICE: HCPCS | Mod: GY | Performed by: INTERNAL MEDICINE

## 2017-06-29 PROCEDURE — 40000193 ZZH STATISTIC PT WARD VISIT

## 2017-06-29 PROCEDURE — 97116 GAIT TRAINING THERAPY: CPT | Mod: GP

## 2017-06-29 PROCEDURE — 25000132 ZZH RX MED GY IP 250 OP 250 PS 637: Mod: GY | Performed by: NURSE PRACTITIONER

## 2017-06-29 PROCEDURE — 97530 THERAPEUTIC ACTIVITIES: CPT | Mod: GP

## 2017-06-29 PROCEDURE — 40000133 ZZH STATISTIC OT WARD VISIT: Performed by: OCCUPATIONAL THERAPIST

## 2017-06-29 PROCEDURE — 40000193 ZZH STATISTIC PT WARD VISIT: Performed by: PHYSICAL THERAPIST

## 2017-06-29 RX ADMIN — ACETAMINOPHEN 975 MG: 325 TABLET, FILM COATED ORAL at 07:59

## 2017-06-29 RX ADMIN — ACETAMINOPHEN 975 MG: 325 TABLET, FILM COATED ORAL at 18:32

## 2017-06-29 RX ADMIN — OXYCODONE HYDROCHLORIDE 2.5 MG: 5 TABLET ORAL at 18:36

## 2017-06-29 RX ADMIN — FLUTICASONE PROPIONATE 2 SPRAY: 50 SPRAY, METERED NASAL at 08:00

## 2017-06-29 RX ADMIN — SENNOSIDES AND DOCUSATE SODIUM 2 TABLET: 8.6; 5 TABLET ORAL at 07:59

## 2017-06-29 RX ADMIN — ASPIRIN 325 MG: 325 TABLET, FILM COATED ORAL at 07:59

## 2017-06-29 RX ADMIN — OMEPRAZOLE 20 MG: 20 CAPSULE, DELAYED RELEASE ORAL at 07:59

## 2017-06-29 RX ADMIN — OXYCODONE HYDROCHLORIDE 2.5 MG: 5 TABLET ORAL at 03:08

## 2017-06-29 NOTE — PROGRESS NOTES
Pt has hematoma behind and lateral to surgical knee. Waiting for ultrasound to rule out lower extremity dvt. If positive for dvt, pt will need to stay to be initiated on proper anticoagulation.If negative may d/c tonight.

## 2017-06-29 NOTE — PROGRESS NOTES
Progressing well following TKA Monday  C/o fullness behind knee  Exam shows ecchymosis posterior to knee  -Homans  Plan US leg to R/O DVT (unlikely)

## 2017-06-29 NOTE — PLAN OF CARE
Problem: Goal Outcome Summary  Goal: Goal Outcome Summary  Outcome: Therapy, progress toward functional goals as expected  Supine to sitting with HOB elevated and mod I.  Sit to/from standing from EOB with FWW and mod I.  Pt ambulated 100' with FWW and mod I.  Pt ascended/descended 2 steps with 2 railings and SBA x 1.  Pt ascended/descended 1 step with FWW and SBA x 1.  Sitting to supine with HOB flat independently.  Supine scooting independently.  Pt educated on car transfer and verbalized understanding of car transfer.  Anticipated discharge home this PM with spouse for assist and OP PT.       Physical Therapy Discharge Summary     Reason for therapy discharge:    Discharged to home with outpatient therapy.     Progress towards therapy goal(s). See goals on Care Plan in [facility] electronic health record for goal details.  Goals partially met.  Barriers to achieving goals:   limited tolerance for therapy.     Therapy recommendation(s):    Continued therapy is recommended.  Rationale/Recommendations:  Pt woudl benefit from further skilled PT for LE strengthening/ROM and gait training.  .

## 2017-06-29 NOTE — PROGRESS NOTES
"[facility], [place]   Internal Medicine Daily Note           Interval History/Events     Overnight events reviewed  Pain controlled. But some times pain medication makes her tired.   No nausea, vomiting  Tolerating diet well  No fever, chills, lightheadedness or dizziness  No chest pain, shortness of breath       Review of Systems        4 point ROS including Respiratory, CV, GI and , other than that noted above is negative      Medications   I have reviewed current medications  in the \"current medication\" section of Epic.  Relevant changes include:     Physical Exam   General:       Vital signs:    Blood pressure 119/51, pulse 70, temperature 98.8  F (37.1  C), resp. rate 16, height 1.499 m (4' 11\"), weight 56.7 kg (125 lb), SpO2 95 %.  Estimated body mass index is 25.25 kg/(m^2) as calculated from the following:    Height as of this encounter: 1.499 m (4' 11\").    Weight as of this encounter: 56.7 kg (125 lb).      Intake/Output Summary (Last 24 hours) at 06/27/17 1611  Last data filed at 06/27/17 1530   Gross per 24 hour   Intake                0 ml   Output             1675 ml   Net            -1675 ml      HEENT: No icterus, no pallor  Cardiovascular: S1, S2 normal.   Respiratory:  B/L CTA  GI/Abdomen: Soft, NT, BS+  Neurology: Alert, awake, and conversing well. No tremors noted.   Extremities: Right lower extremity dressing in place. Full examination per Ortho       Laboratory and Imaging Studies     I have reviewed  laboratory and imaging studies in the Epic. Pertinent findings are as below:    BMP    Recent Labs  Lab 06/27/17  0520      POTASSIUM 4.5   CHLORIDE 105   FRANCISCO 8.1*   CO2 28   BUN 15   CR 0.62   *     CBC    Recent Labs  Lab 06/28/17  0716   HGB 9.6*     INRNo lab results found in last 7 days.  LFTsNo lab results found in last 7 days.   PANCNo lab results found in last 7 days.        Impression/Plan      82 yr old female patient with H/O restless legs, " H/O left breast cancer, S/P mastectomy and H/O adenocarcinoma of the colon  ( both cancers treated) , now S/P Rt TAA    # S/P Rt TKA on 06/26  Management primarily per Ortho team.  - Wound cares, Dressings, Surgical pain management, DVT Prophylaxis  per primary team.   - Encourage Incentive spirometry  - Laxatives for constipation prophylaxis  Primary team obtaining US leg to rule out DVT     # Anemia: Most likely due to Hemodilution, blood loss, and post surgical inflammation.  Hg 9.6 from 10.4    - Transfuse if Hg < 7 gm/dl    - CBC in 2-3 weeks with primary care    # Post operative nausea, vomiting: On  Ondansetron PRN, and Prochlorperazine PRN  - Continue Ondanetron    # Hx of  HTN: PTA on Irbesartan-HCTZ. Initially on lower side. BP starting to come up now. Okay to continue Irbesartan-HCTZ on discharge.   - Continue Irbesartan- HCTZ on discharge    #  RLS: PTA on Gabapentin, and Pramipexole  - Continue    # Migraine headaches: PTA on Sumatriptan PRN and Aspirin-butalbital-caffein PRN  - Continue     #  IBS: PRN Dicyclomine and Lomotil     Pt's care was discussed with bedside RN, patient and  during Care Team Rounds.               Paulo Simental MD  Hospitalist ( Internal medicine)  Pager: [phone]

## 2017-06-29 NOTE — PLAN OF CARE
Problem: Goal Outcome Summary  Goal: Goal Outcome Summary  OT: Patient met all OT goals, completed total body dressing January with adaptive equipment. No further OT needs at this time. Plan to discharge home with  when medically cleared.     Occupational Therapy Discharge Summary     Reason for therapy discharge:    All goals and outcomes met, no further needs identified.     Progress towards therapy goal(s). See goals on Care Plan in [facility] electronic health record for goal details.  Goals met     Therapy recommendation(s):    No further therapy is recommended.

## 2017-06-29 NOTE — PLAN OF CARE
Problem: Individualization  Goal: Patient Preferences      VS:    VSS. Maintaining O2 SATS on room air.    Output:    Voiding independently in bathroom. Last BM 6/28/2017 per patient report    Activity:    SBA x1 with walker. Pt ambulated to bathroom and in [place] with PT/ RN   Skin: UTV under incisional dressing. Painful bruise/hematoma noted on posterior R knee, ultrasound ordered.    Pain:    Pt requested to back off the narcotic pain medication today due to lethargy. Pt reports pain is well managed without PRN narcotics this far this morning.    CMS:     Intact    Dressing:     CDI    Diet:     Regular    LDA:     PIV saline locked- will remove prior to discharge    Equipment:     Walker, TEDS, PCDS, refused to take home CPM machine.    Plan:     Pt to discharge home this afternoon.  present in room and is aware of plan.    Additional Info:

## 2017-06-29 NOTE — DISCHARGE SUMMARY
.  [facility] Orthopaedic Surgery Discharge Summary    Mitra Hall MRN# [id number]   Age: 82 year old YOB: 1935       Date of Admission:  6/26/2017  Date of Discharge::  6/29/2017    Admitting Physician:  Ford Medina MD  Discharge To:  Home    Primary Care Physician:      Chang Simmons          Admission Diagnoses:   Osteoarthritis Right Knee   Hx of total knee arthroplasty  Elective surgery  Hx of total knee arthroplasty         Discharge Diagnosis:   No discharge information exists for this patient.            Procedures Performed:    R Total Knee Arthroplasty         Consultations:   OCCUPATIONAL THERAPY ADULT IP CONSULT  PHYSICAL THERAPY ADULT IP CONSULT  INTERNAL MEDICINE HOSPITALIST ADULT IP CONSULT - [facility] [place]          Brief History:   Mitra Hall has medial compartment osteoarthritis with a Baker cyst posterior to the knee.  Risks, goals and options were discussed on unicompartmental versus total knee arthroplasty.  She understood and she wished to proceed.             Hospital Course:   Patient did well post operatively.  Transitioned from iv medication to oral meds.  AT discharge was taking minimal narcotic. Tolerated diet, resumed normal bowel and bladder function. Worked well with PT/OT/. Pt was found to have a hematoma around her knee. Lower extremity US was negative for DVT. Pt was deemed appropriate for d/c on POD #3.         Medications Prior to Admission:     Prescriptions Prior to Admission   Medication Sig Dispense Refill Last Dose     pramipexole (MIRAPEX) 0.5 MG tablet Take 0.5 mg by mouth   6/25/2017 at 2000     SUMAtriptan (IMITREX) 25 MG tablet Take 25 mg by mouth at onset of headache for migraine   6/22/2017 at 1500     irbesartan-hydrochlorothiazide (AVALIDE) 300-12.5 MG per tablet Take 1 tablet by mouth daily   6/25/2017 at 0800     acetaminophen (ACETAMINOPHEN 8 HOUR) 650 MG CR tablet Take 650 mg by mouth every 8 hours as needed for mild pain  or fever   6/25/2017 at 2200     butalbital-aspirin-caffeine (FIORINAL) -40 MG per capsule Take 1 capsule by mouth 3 times daily as needed for headaches   6/21/2017 at 1500     fluticasone (FLONASE) 50 MCG/ACT spray Spray 2 sprays into both nostrils daily   6/26/2017 at 0800     GABAPENTIN PO Take 800 mg by mouth daily   6/25/2017 at 2200     LORAZEPAM PO Take 0.5 mg by mouth nightly as needed for anxiety   6/25/2017 at 0800     OMEPRAZOLE PO Take 20 mg by mouth every morning   6/25/2017 at 0800     conjugated estrogens (PREMARIN) cream Place vaginally twice a week Uses rarely   Past Week at Unknown time     TRAZODONE HCL PO Take 50 mg by mouth At Bedtime   6/25/2017 at 2200     dicyclomine (BENTYL) 20 MG tablet Take 20 mg by mouth 3 times daily as needed   6/25/2017 at 2000     diphenoxylate-atropine (LOMOTIL) 2.5-0.025 MG per tablet Take 1 tablet by mouth 4 times daily as needed for diarrhea   More than a month at Unknown time     [DISCONTINUED] Naproxen Sodium (ALEVE PO) Take 220 mg by mouth 2 times daily (with meals)   6/23/2017 at 0800     [DISCONTINUED] PRAMIPEXOLE DIHYDROCHLORIDE PO Take 1 mg by mouth daily   6/25/2017 at 2000            Discharge Medications:        Review of your medicines      START taking       Dose / Directions    aspirin - buffered 325 MG Tabs tablet   Commonly known as:  ASCRIPTIN        Dose:  325 mg   Take 1 tablet (325 mg) by mouth daily for 28 days   Quantity:  28 tablet   Refills:  0       oxyCODONE 5 MG IR tablet   Commonly known as:  ROXICODONE        Dose:  2.5-5 mg   Take 0.5-1 tablets (2.5-5 mg) by mouth every 4 hours as needed for moderate to severe pain   Quantity:  50 tablet   Refills:  0       senna-docusate 8.6-50 MG per tablet   Commonly known as:  SENOKOT-S;PERICOLACE        Dose:  1-2 tablet   Take 1-2 tablets by mouth 2 times daily Discontinue when off narcotics.   Quantity:  50 tablet   Refills:  0         CONTINUE these medicines which may have CHANGED, or  have new prescriptions. If we are uncertain of the size of tablets/capsules you have at home, strength may be listed as something that might have changed.       Dose / Directions    aspirin 81 MG tablet   This may have changed:    - medication strength  - additional instructions        Dose:  81 mg   Take 1 tablet (81 mg) by mouth daily Hold this while taking 325 mg ASA then resume usual 81 mg daily.   Quantity:  30 tablet   Refills:  0       pramipexole 0.5 MG tablet   Commonly known as:  MIRAPEX   This may have changed:  Another medication with the same name was removed. Continue taking this medication, and follow the directions you see here.        Dose:  0.5 mg   Take 0.5 mg by mouth   Refills:  0         CONTINUE these medicines which have NOT CHANGED       Dose / Directions    ACETAMINOPHEN 8 HOUR 650 MG CR tablet   Generic drug:  acetaminophen        Dose:  650 mg   Take 650 mg by mouth every 8 hours as needed for mild pain or fever   Refills:  0       butalbital-aspirin-caffeine -40 MG per capsule   Commonly known as:  FIORINAL        Dose:  1 capsule   Take 1 capsule by mouth 3 times daily as needed for headaches   Refills:  0       conjugated estrogens cream   Commonly known as:  PREMARIN        Place vaginally twice a week Uses rarely   Refills:  0       dicyclomine 20 MG tablet   Commonly known as:  BENTYL        Dose:  20 mg   Take 20 mg by mouth 3 times daily as needed   Refills:  0       FLONASE 50 MCG/ACT spray   Generic drug:  fluticasone        Dose:  2 spray   Spray 2 sprays into both nostrils daily   Refills:  0       GABAPENTIN PO        Dose:  800 mg   Take 800 mg by mouth daily   Refills:  0       irbesartan-hydrochlorothiazide 300-12.5 MG per tablet   Commonly known as:  AVALIDE        Dose:  1 tablet   Take 1 tablet by mouth daily   Refills:  0       LOMOTIL 2.5-0.025 MG per tablet   Generic drug:  diphenoxylate-atropine        Dose:  1 tablet   Take 1 tablet by mouth 4 times daily as  needed for diarrhea   Refills:  0       LORAZEPAM PO        Dose:  0.5 mg   Take 0.5 mg by mouth nightly as needed for anxiety   Refills:  0       OMEPRAZOLE PO        Dose:  20 mg   Take 20 mg by mouth every morning   Refills:  0       SUMAtriptan 25 MG tablet   Commonly known as:  IMITREX        Dose:  25 mg   Take 25 mg by mouth at onset of headache for migraine   Refills:  0       TRAZODONE HCL PO        Dose:  50 mg   Take 50 mg by mouth At Bedtime   Refills:  0         STOP taking          ALEVE PO                Where to get your medicines      These medications were sent to [facility] [place], [place] - [address]  [address] [facility] [address], [facility] [address]     Phone:  [phone]      aspirin - buffered 325 MG Tabs tablet     senna-docusate 8.6-50 MG per tablet         Some of these will need a paper prescription and others can be bought over the counter. Ask your nurse if you have questions.     Bring a paper prescription for each of these medications      oxyCODONE 5 MG IR tablet                     Pending Results at Discharge:   None         Discharge Instructions:     Discharge Procedure Orders  Physical Therapy Referral   Referral Type: Rehab Therapy Physical Therapy     Reason for your hospital stay   Order Comments: You had knee surgery.     Follow Up and recommended labs and tests   Order Comments: Follow up with Dr Medina's team in two weeks for a wound check. If you have not made a follow up appointment please do so.  [facility] orthopedic [facility] [phone]    Email: [email or web address]    Call the clinic if you have not heard about your follow up appointments within 7 days.     Activity   Order Comments: Your activity upon discharge: as tolerated. Use CPM, advance as able for 4-6 hours daily. Elevate and ice periodically throughout the day.   Order Specific Question Answer Comments   Is discharge order? Yes      When to contact your care team   Order Comments: Call  your physician for fevers greater than 101.5, chills, increased pain, redness, swelling or discharge at the surgical site. During regular business hours call Dr Medina's  office and request to speak with his nurse or the triage nurses. If you see him at [facility] call [phone]. If you see him at [place] call [phone]. After hours or on weekends call the hospital  at [phone] and ask to speak with the resident on call.     Wound care and dressings   Order Comments: Instructions to care for your wound at home: You have a brown dressing on which should remain in place 5 days. You may shower over this dressing. After 5 days you may remove it. Underneath there may be steri-strips. Leave these in place. You may shower with your wound un covered as long as it is clean and dry. Do not scrub your wound. You may leave your wound uncovered as long as it is clean and dry. Notify your physician if you notice any drainage.     Discharge Instructions   Order Comments: Wear TANNER hose on both lower legs. May remove them for hygiene. Continue to wear these until told not to be your physician.       Future Appointments  Date Time Provider Department [place]   6/29/2017 2:30 PM Mena Christy Pt, PT [facility] [place]   6/30/2017 10:15 AM Jessica Benavides, P[facility]O [facility]       ABDULAZIZ Carvalho, CNP  Department of Orthopedic Surgery  [facility]  [phone]

## 2017-06-29 NOTE — PROGRESS NOTES
"   Orthopaedic Surgery Progress Note     Dx: Medial compartment osteoarthritis, right knee.   Tx:  Right total knee arthroplasty with computer navigation.     E: No acute events overnight.    S: Pain well controlled, denies  Numbness/tingling. Feels sleepy today.   O:   /51  Pulse 70  Temp 98.8  F (37.1  C)  Resp 16  Ht 1.499 m (4' 11\")  Wt 56.7 kg (125 lb)  SpO2 95%  BMI 25.25 kg/m2 no bm yet     Exam:  Gen: NAD, A/O x 3  Resp: Comfortable, non-labored breathing   Abd: soft, bs +  -Wound dressed, c/d/i/ hematoma behind and medial to knee. Soft.  -Sens: SILT dp/sp/s/s/t numbness over anterior foot, side and between toes intact.  -Motor: 5/5 EHL/FHL/TA/GSc-   -Vasc: 2+ pulses, wwp, brisk cap refill      Recent Labs  Lab 06/27/17  0520      POTASSIUM 4.5   CHLORIDE 105   CO2 28   BUN 15   CR 0.62   *       Recent Labs  Lab 06/28/17  0716 06/27/17  0520   HGB 9.6* 10.4*          Impression: 82 year old female s/p Right total knee arthroplasty with computer navigation.   on 6- doing well    Plan:  - Activity: up as tolerated./ ultrasound lower extremity.today   - Antibiotics: complete  - Diet: as tolerated  - DVT prophylaxis:  mg, po daily x 4 weeks.- Wound Care: Aquacel, change on POD#7. May shower with dressing on.  - Drain: d/c   - Pain management: PNB/oral   - Physical Therapy: s/p TKA  - Occupational Therapy: S/P TKA   - Dispo: Lives with . Anticipates d/c to home.POD 2-3.  - Follow up: 2 weeks/ with Dr Oliver's team at [facility] [place].   [facility] orthopedic [facility] [phone]  Email: [email or web address]    Call the clinic if you have not heard about your follow up appointments within 7 days.         Future Appointments  Date Time Provider Department [place]   6/27/2017 11:15 AM Jessica Benavides PTA [facility] [place]   6/27/2017 3:30 PM Jessica Benavides PTA [facility] [place]        ABDULAZIZ Carvalho, CNP  Department of Orthopedic Surgery  [facility] " [facility]  [phone]    For any questions regarding this patient please page me at the above number prior to contacting the ortho resident on call.    Discussed with Dr Medina.

## 2017-06-29 NOTE — PLAN OF CARE
Problem: Goal Outcome Summary  Goal: Goal Outcome Summary  Outcome: No Change  A/Ox4, VSS, LCTA, BS active, voiding adequate amounts in BR, PIV SL, Dressing CDI, Ice applied, CMS intact, CPM on 0-75. Patient Denies- Chest Pain, SOB, Nausea, Calf Tenderness and Dizziness.  Patient denies numbness and tingling in all extremities. Patient is able to make needs known and call light is within reach. Will continue to monitor.

## 2017-06-29 NOTE — PLAN OF CARE
Problem: Goal Outcome Summary  Goal: Goal Outcome Summary  PT: Patient progressing toward PT goals as expected and displayed increased AAROM of right knee. She ambulated 100 feet x 2 using rolling walker with completed stair training with CGA to SBA. Patient completed chair exercises with good tolerance and AAROM 0-7-88. Patient was independent with bed mobility and SBA with transfers. Recommend d/c to home when medically stable per plan of care established by physical therapist.

## 2017-06-30 NOTE — PLAN OF CARE
Problem: Knee Replacement, Total (Adult)  Goal: Signs and Symptoms of Listed Potential Problems Will be Absent or Manageable (Knee Replacement, Total)  Signs and symptoms of listed potential problems will be absent or manageable by discharge/transition of care (reference Knee Replacement, Total (Adult) CPG).   Pt. discharged at 1840 to home after a negative result for right leg ultrasound. Pt. was accompanied by son and transport staff, and left with personal belongings. Pt. received complete discharge paperwork and all medications as filled by discharge pharmacy. Pt. was given times of last dose for all discharge medications in writing on discharge medication sheets. Discharge teaching included medication, pain management, activity restrictions, dressing changes, and signs and symptoms of infection. Pt. to follow up with Dr. Medina in two weeks. Pt. had no further questions at the time of discharge and no unmet needs were identified.

## 2017-07-12 ENCOUNTER — TRANSFERRED RECORDS (OUTPATIENT)
Dept: HEALTH INFORMATION MANAGEMENT | Facility: CLINIC | Age: 82
End: 2017-07-12

## 2017-07-29 ENCOUNTER — COMMUNICATION - HEALTHEAST (OUTPATIENT)
Dept: INTERNAL MEDICINE | Facility: CLINIC | Age: 82
End: 2017-07-29

## 2017-07-29 DIAGNOSIS — G25.81 RESTLESS LEG SYNDROME: ICD-10-CM

## 2017-08-06 ENCOUNTER — COMMUNICATION - HEALTHEAST (OUTPATIENT)
Dept: INTERNAL MEDICINE | Facility: CLINIC | Age: 82
End: 2017-08-06

## 2017-08-06 DIAGNOSIS — G47.00 INSOMNIA: ICD-10-CM

## 2017-10-05 ENCOUNTER — COMMUNICATION - HEALTHEAST (OUTPATIENT)
Dept: INTERNAL MEDICINE | Facility: CLINIC | Age: 82
End: 2017-10-05

## 2017-10-05 DIAGNOSIS — G25.81 RESTLESS LEGS SYNDROME (RLS): ICD-10-CM

## 2017-10-13 ENCOUNTER — HOSPITAL ENCOUNTER (OUTPATIENT)
Dept: MAMMOGRAPHY | Facility: HOSPITAL | Age: 82
Discharge: HOME | End: 2017-10-13
Attending: INTERNAL MEDICINE

## 2017-10-13 DIAGNOSIS — Z12.31 VISIT FOR SCREENING MAMMOGRAM: ICD-10-CM

## 2017-11-08 DIAGNOSIS — Z96.651 STATUS POST TOTAL RIGHT KNEE REPLACEMENT: Primary | ICD-10-CM

## 2017-11-13 ENCOUNTER — RECORDS - HEALTHEAST (OUTPATIENT)
Dept: ADMINISTRATIVE | Facility: OTHER | Age: 82
End: 2017-11-13

## 2017-11-13 ENCOUNTER — COMMUNICATION - HEALTHEAST (OUTPATIENT)
Dept: INTERNAL MEDICINE | Facility: CLINIC | Age: 82
End: 2017-11-13

## 2017-11-13 ENCOUNTER — OFFICE VISIT (OUTPATIENT)
Dept: ORTHOPEDICS | Facility: CLINIC | Age: 82
End: 2017-11-13

## 2017-11-13 DIAGNOSIS — Z96.651 STATUS POST TOTAL RIGHT KNEE REPLACEMENT: Primary | ICD-10-CM

## 2017-11-13 DIAGNOSIS — G47.00 INSOMNIA: ICD-10-CM

## 2017-11-13 DIAGNOSIS — N89.8 VAGINAL DRYNESS: ICD-10-CM

## 2017-11-13 DIAGNOSIS — G25.81 RESTLESS LEG SYNDROME: ICD-10-CM

## 2017-11-13 ASSESSMENT — ENCOUNTER SYMPTOMS
LEG PAIN: 0
MUSCLE WEAKNESS: 0
JOINT SWELLING: 1
HYPERTENSION: 1
BACK PAIN: 1
LIGHT-HEADEDNESS: 0
ORTHOPNEA: 0
SYNCOPE: 0
EXERCISE INTOLERANCE: 0
MUSCLE CRAMPS: 0
NECK PAIN: 1
MYALGIAS: 0
SLEEP DISTURBANCES DUE TO BREATHING: 0
STIFFNESS: 1
PALPITATIONS: 0
HYPOTENSION: 0
ARTHRALGIAS: 0

## 2017-11-13 NOTE — LETTER
"11/13/2017       RE: Mitra Hall  [address]  [facility] [address]     Dear Colleague,    Thank you for referring your patient, Mitra Hall, to the [place] [facility] at [facility]. Please see a copy of my visit note below.    Mitra is seen in follow up nearly 5 months post R TKA. DOS: 6/26/2017. She reports doing well and has \"no complaints\". She cycles daily and has done her rehab on a regular basis.  Takes nothing for pain. Walks daily. No night pain. \"I feel fantastic\".    PMH: Reviewed    ROS: Reviewed    PE: Pleasant and cooperative female alert and oriented x3. Arises from the chair unguarded and without using arm rails. Walks without a limp. ROM 0-122. Incision is well healed/intact without effusion or warmth. Good ligament stability noted. + CMS without neuro deficit. Alignment is near neutral. Skin intact.    Xrays show good position of right TKA with good fit of tibial and femoral components.     DX: R TKA    Plan:  1. Continue rehab/stretching daily.  2. Antibiotics were discussed lifelong  3. F/u prn  Answers for HPI/ROS submitted by the patient on 11/13/2017   General Symptoms: No  Skin Symptoms: No  HENT Symptoms: No  EYE SYMPTOMS: No  HEART SYMPTOMS: No  LUNG SYMPTOMS: No  INTESTINAL SYMPTOMS: No  URINARY SYMPTOMS: No  GYNECOLOGIC SYMPTOMS: No  BREAST SYMPTOMS: No  SKELETAL SYMPTOMS: Yes  BLOOD SYMPTOMS: No  NERVOUS SYSTEM SYMPTOMS: No  MENTAL HEALTH SYMPTOMS: No    I, Dr. Ford Medina, have seen and examined this patient with ABDULAZIZ Colindres, CNP.      Again, thank you for allowing me to participate in the care of your patient.      Sincerely,    Ford Medina MD      "

## 2017-11-13 NOTE — PROGRESS NOTES
"Mitra is seen in follow up nearly 5 months post R TKA. DOS: 6/26/2017. She reports doing well and has \"no complaints\". She cycles daily and has done her rehab on a regular basis.  Takes nothing for pain. Walks daily. No night pain. \"I feel fantastic\".    PMH: Reviewed    ROS: Reviewed    PE: Pleasant and cooperative female alert and oriented x3. Arises from the chair unguarded and without using arm rails. Walks without a limp. ROM 0-122. Incision is well healed/intact without effusion or warmth. Good ligament stability noted. + CMS without neuro deficit. Alignment is near neutral. Skin intact.    Xrays show good position of right TKA with good fit of tibial and femoral components.     DX: R TKA    Plan:  1. Continue rehab/stretching daily.  2. Antibiotics were discussed lifelong  3. F/u prn  Answers for HPI/ROS submitted by the patient on 11/13/2017   General Symptoms: No  Skin Symptoms: No  HENT Symptoms: No  EYE SYMPTOMS: No  HEART SYMPTOMS: No  LUNG SYMPTOMS: No  INTESTINAL SYMPTOMS: No  URINARY SYMPTOMS: No  GYNECOLOGIC SYMPTOMS: No  BREAST SYMPTOMS: No  SKELETAL SYMPTOMS: Yes  BLOOD SYMPTOMS: No  NERVOUS SYSTEM SYMPTOMS: No  MENTAL HEALTH SYMPTOMS: No    I, Dr. Ford Medina, have seen and examined this patient with ABDULAZIZ Colindres, CNP.    "

## 2017-11-13 NOTE — MR AVS SNAPSHOT
After Visit Summary   2017    Mitra Hall    MRN: [id number]           Patient Information     Date Of Birth          1935        Visit Information        Provider Department      2017 3:45 PM Ford Medina MD [facility]        Today's Diagnoses     Status post total right knee replacement    -  1       Follow-ups after your visit        Who to contact     Please call your clinic at [phone] to:    Ask questions about your health    Make or cancel appointments    Discuss your medicines    Learn about your test results    Speak to your doctor   If you have compliments or concerns about an experience at your clinic, or if you wish to file a complaint, please contact [facility] Patient Relations at [phone] or email us at [email or web address]         Additional Information About Your Visit        MyChart Information     [employer] is an electronic gateway that provides easy, online access to your medical records. With [employer], you can request a clinic appointment, read your test results, renew a prescription or communicate with your care team.     To sign up for [employer] visit the website at [email or web address]   You will be asked to enter the access code listed below, as well as some personal information. Please follow the directions to create your username and password.     Your access code is: [email or web address]  Expires: 2018  5:30 AM     Your access code will  in 90 days. If you need help or a new code, please contact your [facility] or call [phone] for assistance.        Care EveryWhere ID     This is your Care EveryWhere ID. This could be used by other organizations to access your [facility] medical records  [id number]         Blood Pressure from Last 3 Encounters:   17 119/51    Weight from Last 3 Encounters:   17 56.7 kg (125 lb)               Today, you had the following     No orders found for display       Primary Care Provider Office Phone # Fax #    Chang Simmons [phone]       [facility] [address] [address]        Equal Access to Services     KEISHA JENNIFER : Hadii aad ku hadasho Soomaali, waaxda luqadaha, qaybta kaalmada adeegyada, waxay reyesin haycarolynn adesathish velez lakaednnano . So [facility] [place] [phone].    ATENCIÓN: Si habla español, tiene a mccauley disposición servicios gratuitos de asistencia lingüística. Llame al [phone].    We comply with applicable federal civil rights laws and [place] laws. We do not discriminate on the basis of race, color, national origin, age, disability, sex, sexual orientation, or gender identity.            Thank you!     Thank you for choosing [facility]  for your care. Our goal is always to provide you with excellent care. Hearing back from our patients is one way we can continue to improve our services. Please take a few minutes to complete the written survey that you may receive in the mail after your visit with us. Thank you!             Your Updated Medication List - Protect others around you: Learn how to safely use, store and throw away your medicines at www.disposemymeds.org.          This list is accurate as of: 11/13/17  5:24 PM.  Always use your most recent med list.                   Brand Name Dispense Instructions for use Diagnosis    ACETAMINOPHEN 8 HOUR 650 MG CR tablet   Generic drug:  acetaminophen      Take 650 mg by mouth every 8 hours as needed for mild pain or fever        aspirin 81 MG tablet     30 tablet    Take 1 tablet (81 mg) by mouth daily Hold this while taking 325 mg ASA then resume usual 81 mg daily.        butalbital-aspirin-caffeine -40 MG per capsule    FIORINAL     Take 1 capsule by mouth 3 times daily as needed for headaches        conjugated estrogens cream    PREMARIN     Place vaginally twice a week Uses  rarely        dicyclomine 20 MG tablet    BENTYL     Take 20 mg by mouth 3 times daily as needed        FLONASE 50 MCG/ACT spray   Generic drug:  fluticasone      Spray 2 sprays into both nostrils daily        GABAPENTIN PO      Take 800 mg by mouth daily        irbesartan-hydrochlorothiazide 300-12.5 MG per tablet    AVALIDE     Take 1 tablet by mouth daily        LOMOTIL 2.5-0.025 MG per tablet   Generic drug:  diphenoxylate-atropine      Take 1 tablet by mouth 4 times daily as needed for diarrhea        LORAZEPAM PO      Take 0.5 mg by mouth nightly as needed for anxiety        OMEPRAZOLE PO      Take 20 mg by mouth every morning        pramipexole 0.5 MG tablet    MIRAPEX     Take 0.5 mg by mouth        SUMAtriptan 25 MG tablet    IMITREX     Take 25 mg by mouth at onset of headache for migraine        TRAZODONE HCL PO      Take 50 mg by mouth At Bedtime

## 2017-12-21 ENCOUNTER — COMMUNICATION - HEALTHEAST (OUTPATIENT)
Dept: INTERNAL MEDICINE | Facility: CLINIC | Age: 82
End: 2017-12-21

## 2017-12-22 ENCOUNTER — OFFICE VISIT - HEALTHEAST (OUTPATIENT)
Dept: INTERNAL MEDICINE | Facility: CLINIC | Age: 82
End: 2017-12-22

## 2017-12-22 DIAGNOSIS — Z01.818 PREOP EXAMINATION: ICD-10-CM

## 2017-12-22 DIAGNOSIS — C18.0 MALIGNANT NEOPLASM OF CECUM (H): ICD-10-CM

## 2017-12-22 DIAGNOSIS — H02.403 PTOSIS OF EYELID, BILATERAL: ICD-10-CM

## 2017-12-22 DIAGNOSIS — C50.919 MALIGNANT NEOPLASM OF FEMALE BREAST (H): ICD-10-CM

## 2017-12-22 ASSESSMENT — MIFFLIN-ST. JEOR: SCORE: 936.23

## 2017-12-23 ENCOUNTER — COMMUNICATION - HEALTHEAST (OUTPATIENT)
Dept: INTERNAL MEDICINE | Facility: CLINIC | Age: 82
End: 2017-12-23

## 2017-12-26 ENCOUNTER — COMMUNICATION - HEALTHEAST (OUTPATIENT)
Dept: INTERNAL MEDICINE | Facility: CLINIC | Age: 82
End: 2017-12-26

## 2018-01-08 ENCOUNTER — COMMUNICATION - HEALTHEAST (OUTPATIENT)
Dept: INTERNAL MEDICINE | Facility: CLINIC | Age: 83
End: 2018-01-08

## 2018-01-08 DIAGNOSIS — G25.81 RESTLESS LEG SYNDROME: ICD-10-CM

## 2018-01-08 DIAGNOSIS — G47.00 INSOMNIA: ICD-10-CM

## 2018-01-11 ENCOUNTER — COMMUNICATION - HEALTHEAST (OUTPATIENT)
Dept: INTERNAL MEDICINE | Facility: CLINIC | Age: 83
End: 2018-01-11

## 2018-01-11 ENCOUNTER — AMBULATORY - HEALTHEAST (OUTPATIENT)
Dept: INTERNAL MEDICINE | Facility: CLINIC | Age: 83
End: 2018-01-11

## 2018-01-23 ENCOUNTER — COMMUNICATION - HEALTHEAST (OUTPATIENT)
Dept: SCHEDULING | Facility: CLINIC | Age: 83
End: 2018-01-23

## 2018-01-23 DIAGNOSIS — N89.8 VAGINAL DISCHARGE: ICD-10-CM

## 2018-01-25 ENCOUNTER — OFFICE VISIT - HEALTHEAST (OUTPATIENT)
Dept: OBGYN | Facility: CLINIC | Age: 83
End: 2018-01-25

## 2018-01-25 DIAGNOSIS — B96.89 BV (BACTERIAL VAGINOSIS): ICD-10-CM

## 2018-01-25 DIAGNOSIS — N89.8 VAGINAL DISCHARGE: ICD-10-CM

## 2018-01-25 DIAGNOSIS — N76.0 BV (BACTERIAL VAGINOSIS): ICD-10-CM

## 2018-01-25 LAB
CLUE CELLS: ABNORMAL
TRICHOMONAS, WET PREP: ABNORMAL
YEAST, WET PREP: ABNORMAL

## 2018-01-25 ASSESSMENT — MIFFLIN-ST. JEOR: SCORE: 936.23

## 2018-01-29 ENCOUNTER — OFFICE VISIT - HEALTHEAST (OUTPATIENT)
Dept: FAMILY MEDICINE | Facility: CLINIC | Age: 83
End: 2018-01-29

## 2018-01-29 DIAGNOSIS — H61.20 CERUMEN IMPACTION: ICD-10-CM

## 2018-01-29 DIAGNOSIS — R42 VERTIGO: ICD-10-CM

## 2018-02-06 ENCOUNTER — OFFICE VISIT - HEALTHEAST (OUTPATIENT)
Dept: INTERNAL MEDICINE | Facility: CLINIC | Age: 83
End: 2018-02-06

## 2018-02-06 DIAGNOSIS — N89.8 VAGINAL DRYNESS: ICD-10-CM

## 2018-02-06 DIAGNOSIS — M47.812 CERVICAL ARTHRITIS: ICD-10-CM

## 2018-02-06 DIAGNOSIS — R42 DIZZINESS: ICD-10-CM

## 2018-02-06 DIAGNOSIS — M48.02 CERVICAL STENOSIS OF SPINE: ICD-10-CM

## 2018-02-06 ASSESSMENT — MIFFLIN-ST. JEOR: SCORE: 931.7

## 2018-03-07 ENCOUNTER — COMMUNICATION - HEALTHEAST (OUTPATIENT)
Dept: INTERNAL MEDICINE | Facility: CLINIC | Age: 83
End: 2018-03-07

## 2018-03-14 ENCOUNTER — COMMUNICATION - HEALTHEAST (OUTPATIENT)
Dept: INTERNAL MEDICINE | Facility: CLINIC | Age: 83
End: 2018-03-14

## 2018-03-14 DIAGNOSIS — G25.81 RESTLESS LEGS SYNDROME (RLS): ICD-10-CM

## 2018-04-26 ENCOUNTER — TELEPHONE (OUTPATIENT)
Dept: ORTHOPEDICS | Facility: CLINIC | Age: 83
End: 2018-04-26

## 2018-06-18 ENCOUNTER — COMMUNICATION - HEALTHEAST (OUTPATIENT)
Dept: INTERNAL MEDICINE | Facility: CLINIC | Age: 83
End: 2018-06-18

## 2018-07-05 ENCOUNTER — COMMUNICATION - HEALTHEAST (OUTPATIENT)
Dept: SCHEDULING | Facility: CLINIC | Age: 83
End: 2018-07-05

## 2018-07-05 ENCOUNTER — COMMUNICATION - HEALTHEAST (OUTPATIENT)
Dept: INTERNAL MEDICINE | Facility: CLINIC | Age: 83
End: 2018-07-05

## 2018-07-24 ENCOUNTER — COMMUNICATION - HEALTHEAST (OUTPATIENT)
Dept: INTERNAL MEDICINE | Facility: CLINIC | Age: 83
End: 2018-07-24

## 2018-07-24 DIAGNOSIS — G25.81 RESTLESS LEG SYNDROME: ICD-10-CM

## 2018-08-23 ENCOUNTER — COMMUNICATION - HEALTHEAST (OUTPATIENT)
Dept: INTERNAL MEDICINE | Facility: CLINIC | Age: 83
End: 2018-08-23

## 2018-08-23 ENCOUNTER — OFFICE VISIT - HEALTHEAST (OUTPATIENT)
Dept: INTERNAL MEDICINE | Facility: CLINIC | Age: 83
End: 2018-08-23

## 2018-08-23 DIAGNOSIS — G47.00 INSOMNIA: ICD-10-CM

## 2018-08-23 DIAGNOSIS — Z01.818 PREOPERATIVE EXAMINATION: ICD-10-CM

## 2018-08-23 DIAGNOSIS — C18.0 MALIGNANT NEOPLASM OF CECUM (H): ICD-10-CM

## 2018-08-23 DIAGNOSIS — M48.02 CERVICAL STENOSIS OF SPINE: ICD-10-CM

## 2018-08-23 DIAGNOSIS — G25.81 RESTLESS LEGS SYNDROME (RLS): ICD-10-CM

## 2018-08-23 DIAGNOSIS — Z00.00 ROUTINE GENERAL MEDICAL EXAMINATION AT A HEALTH CARE FACILITY: ICD-10-CM

## 2018-08-23 DIAGNOSIS — M17.9 OSTEOARTHRITIS, KNEE: ICD-10-CM

## 2018-08-23 DIAGNOSIS — C50.919 MALIGNANT NEOPLASM OF FEMALE BREAST (H): ICD-10-CM

## 2018-08-23 LAB
ALBUMIN SERPL-MCNC: 3.9 G/DL (ref 3.5–5)
ALP SERPL-CCNC: 57 U/L (ref 45–120)
ALT SERPL W P-5'-P-CCNC: 20 U/L (ref 0–45)
ANION GAP SERPL CALCULATED.3IONS-SCNC: 11 MMOL/L (ref 5–18)
AST SERPL W P-5'-P-CCNC: 18 U/L (ref 0–40)
BILIRUB SERPL-MCNC: 0.6 MG/DL (ref 0–1)
BUN SERPL-MCNC: 24 MG/DL (ref 8–28)
CALCIUM SERPL-MCNC: 9.7 MG/DL (ref 8.5–10.5)
CHLORIDE BLD-SCNC: 100 MMOL/L (ref 98–107)
CHOLEST SERPL-MCNC: 191 MG/DL
CO2 SERPL-SCNC: 26 MMOL/L (ref 22–31)
CREAT SERPL-MCNC: 0.7 MG/DL (ref 0.6–1.1)
ERYTHROCYTE [DISTWIDTH] IN BLOOD BY AUTOMATED COUNT: 12.7 % (ref 11–14.5)
FASTING STATUS PATIENT QL REPORTED: NORMAL
GFR SERPL CREATININE-BSD FRML MDRD: >60 ML/MIN/1.73M2
GLUCOSE BLD-MCNC: 91 MG/DL (ref 70–125)
HCT VFR BLD AUTO: 37.2 % (ref 35–47)
HDLC SERPL-MCNC: 57 MG/DL
HGB BLD-MCNC: 12 G/DL (ref 12–16)
LDLC SERPL CALC-MCNC: 117 MG/DL
MCH RBC QN AUTO: 27.7 PG (ref 27–34)
MCHC RBC AUTO-ENTMCNC: 32.3 G/DL (ref 32–36)
MCV RBC AUTO: 86 FL (ref 80–100)
PLATELET # BLD AUTO: 193 THOU/UL (ref 140–440)
PMV BLD AUTO: 8.6 FL (ref 7–10)
POTASSIUM BLD-SCNC: 4.3 MMOL/L (ref 3.5–5)
PROT SERPL-MCNC: 6.6 G/DL (ref 6–8)
RBC # BLD AUTO: 4.34 MILL/UL (ref 3.8–5.4)
SODIUM SERPL-SCNC: 137 MMOL/L (ref 136–145)
TRIGL SERPL-MCNC: 85 MG/DL
WBC: 3.3 THOU/UL (ref 4–11)

## 2018-08-23 ASSESSMENT — MIFFLIN-ST. JEOR: SCORE: 931.7

## 2018-09-08 ENCOUNTER — COMMUNICATION - HEALTHEAST (OUTPATIENT)
Dept: INTERNAL MEDICINE | Facility: CLINIC | Age: 83
End: 2018-09-08

## 2018-09-08 DIAGNOSIS — G47.00 INSOMNIA: ICD-10-CM

## 2018-09-11 ENCOUNTER — COMMUNICATION - HEALTHEAST (OUTPATIENT)
Dept: INTERNAL MEDICINE | Facility: CLINIC | Age: 83
End: 2018-09-11

## 2018-09-13 ENCOUNTER — COMMUNICATION - HEALTHEAST (OUTPATIENT)
Dept: INTERNAL MEDICINE | Facility: CLINIC | Age: 83
End: 2018-09-13

## 2018-09-13 DIAGNOSIS — N89.8 VAGINAL DRYNESS: ICD-10-CM

## 2018-10-01 ENCOUNTER — RECORDS - HEALTHEAST (OUTPATIENT)
Dept: ADMINISTRATIVE | Facility: OTHER | Age: 83
End: 2018-10-01

## 2018-10-15 ENCOUNTER — OFFICE VISIT - HEALTHEAST (OUTPATIENT)
Dept: FAMILY MEDICINE | Facility: CLINIC | Age: 83
End: 2018-10-15

## 2018-10-15 DIAGNOSIS — R39.15 URINARY URGENCY: ICD-10-CM

## 2018-10-15 LAB
ALBUMIN UR-MCNC: NEGATIVE MG/DL
APPEARANCE UR: CLEAR
BACTERIA #/AREA URNS HPF: ABNORMAL HPF
BILIRUB UR QL STRIP: NEGATIVE
COLOR UR AUTO: YELLOW
GLUCOSE UR STRIP-MCNC: NEGATIVE MG/DL
HGB UR QL STRIP: NEGATIVE
KETONES UR STRIP-MCNC: NEGATIVE MG/DL
LEUKOCYTE ESTERASE UR QL STRIP: ABNORMAL
NITRATE UR QL: NEGATIVE
PH UR STRIP: 7 [PH] (ref 5–8)
RBC #/AREA URNS AUTO: ABNORMAL HPF
SP GR UR STRIP: 1.01 (ref 1–1.03)
SQUAMOUS #/AREA URNS AUTO: ABNORMAL LPF
UROBILINOGEN UR STRIP-ACNC: ABNORMAL
WBC #/AREA URNS AUTO: ABNORMAL HPF

## 2018-10-17 ENCOUNTER — AMBULATORY - HEALTHEAST (OUTPATIENT)
Dept: FAMILY MEDICINE | Facility: CLINIC | Age: 83
End: 2018-10-17

## 2018-10-17 ENCOUNTER — COMMUNICATION - HEALTHEAST (OUTPATIENT)
Dept: INTERNAL MEDICINE | Facility: CLINIC | Age: 83
End: 2018-10-17

## 2018-10-17 DIAGNOSIS — N89.8 VAGINAL DRYNESS: ICD-10-CM

## 2018-10-17 DIAGNOSIS — N39.0 URINARY TRACT INFECTION: ICD-10-CM

## 2018-10-18 LAB
BACTERIA SPEC CULT: ABNORMAL
BACTERIA SPEC CULT: ABNORMAL

## 2018-10-19 ENCOUNTER — COMMUNICATION - HEALTHEAST (OUTPATIENT)
Dept: SCHEDULING | Facility: CLINIC | Age: 83
End: 2018-10-19

## 2018-10-27 ENCOUNTER — COMMUNICATION - HEALTHEAST (OUTPATIENT)
Dept: SCHEDULING | Facility: CLINIC | Age: 83
End: 2018-10-27

## 2018-11-05 ENCOUNTER — HOSPITAL ENCOUNTER (OUTPATIENT)
Dept: MAMMOGRAPHY | Facility: CLINIC | Age: 83
Discharge: HOME | End: 2018-11-05
Attending: INTERNAL MEDICINE

## 2018-11-05 DIAGNOSIS — Z12.31 VISIT FOR SCREENING MAMMOGRAM: ICD-10-CM

## 2018-11-14 ENCOUNTER — RECORDS - HEALTHEAST (OUTPATIENT)
Dept: ADMINISTRATIVE | Facility: OTHER | Age: 83
End: 2018-11-14

## 2018-11-19 ENCOUNTER — COMMUNICATION - HEALTHEAST (OUTPATIENT)
Dept: INTERNAL MEDICINE | Facility: CLINIC | Age: 83
End: 2018-11-19

## 2018-11-19 DIAGNOSIS — N89.8 VAGINAL DRYNESS: ICD-10-CM

## 2018-11-30 ENCOUNTER — COMMUNICATION - HEALTHEAST (OUTPATIENT)
Dept: INTERNAL MEDICINE | Facility: CLINIC | Age: 83
End: 2018-11-30

## 2018-11-30 DIAGNOSIS — N89.8 VAGINAL DRYNESS: ICD-10-CM

## 2018-12-03 ENCOUNTER — OFFICE VISIT - HEALTHEAST (OUTPATIENT)
Dept: INTERNAL MEDICINE | Facility: CLINIC | Age: 83
End: 2018-12-03

## 2018-12-03 DIAGNOSIS — Z01.818 PREOP GENERAL PHYSICAL EXAM: ICD-10-CM

## 2018-12-03 DIAGNOSIS — C18.0 MALIGNANT NEOPLASM OF CECUM (H): ICD-10-CM

## 2018-12-03 DIAGNOSIS — C50.212 MALIGNANT NEOPLASM OF UPPER-INNER QUADRANT OF LEFT FEMALE BREAST, UNSPECIFIED ESTROGEN RECEPTOR STATUS (H): ICD-10-CM

## 2018-12-03 DIAGNOSIS — H25.013 CORTICAL AGE-RELATED CATARACT OF BOTH EYES: ICD-10-CM

## 2018-12-03 LAB
ANION GAP SERPL CALCULATED.3IONS-SCNC: 11 MMOL/L (ref 5–18)
ATRIAL RATE - MUSE: 84 BPM
BUN SERPL-MCNC: 19 MG/DL (ref 8–28)
CALCIUM SERPL-MCNC: 9.9 MG/DL (ref 8.5–10.5)
CHLORIDE BLD-SCNC: 98 MMOL/L (ref 98–107)
CO2 SERPL-SCNC: 27 MMOL/L (ref 22–31)
CREAT SERPL-MCNC: 0.73 MG/DL (ref 0.6–1.1)
DIASTOLIC BLOOD PRESSURE - MUSE: NORMAL MMHG
GFR SERPL CREATININE-BSD FRML MDRD: >60 ML/MIN/1.73M2
GLUCOSE BLD-MCNC: 90 MG/DL (ref 70–125)
HGB BLD-MCNC: 12.7 G/DL (ref 12–16)
INTERPRETATION ECG - MUSE: NORMAL
P AXIS - MUSE: 76 DEGREES
POTASSIUM BLD-SCNC: 3.8 MMOL/L (ref 3.5–5)
PR INTERVAL - MUSE: 232 MS
QRS DURATION - MUSE: 76 MS
QT - MUSE: 376 MS
QTC - MUSE: 444 MS
R AXIS - MUSE: 18 DEGREES
SODIUM SERPL-SCNC: 136 MMOL/L (ref 136–145)
SYSTOLIC BLOOD PRESSURE - MUSE: NORMAL MMHG
T AXIS - MUSE: 90 DEGREES
VENTRICULAR RATE- MUSE: 84 BPM

## 2018-12-03 ASSESSMENT — MIFFLIN-ST. JEOR: SCORE: 922.63

## 2018-12-05 ENCOUNTER — RECORDS - HEALTHEAST (OUTPATIENT)
Dept: ADMINISTRATIVE | Facility: OTHER | Age: 83
End: 2018-12-05

## 2018-12-11 ENCOUNTER — COMMUNICATION - HEALTHEAST (OUTPATIENT)
Dept: INTERNAL MEDICINE | Facility: CLINIC | Age: 83
End: 2018-12-11

## 2018-12-11 DIAGNOSIS — G47.00 INSOMNIA: ICD-10-CM

## 2018-12-12 ENCOUNTER — RECORDS - HEALTHEAST (OUTPATIENT)
Dept: ADMINISTRATIVE | Facility: OTHER | Age: 83
End: 2018-12-12

## 2019-01-02 ENCOUNTER — OFFICE VISIT - HEALTHEAST (OUTPATIENT)
Dept: FAMILY MEDICINE | Facility: CLINIC | Age: 84
End: 2019-01-02

## 2019-01-02 DIAGNOSIS — N89.8 VAGINAL DISCHARGE: ICD-10-CM

## 2019-01-02 LAB
CLUE CELLS: NORMAL
TRICHOMONAS, WET PREP: NORMAL
YEAST, WET PREP: NORMAL

## 2019-01-09 ENCOUNTER — RECORDS - HEALTHEAST (OUTPATIENT)
Dept: ADMINISTRATIVE | Facility: OTHER | Age: 84
End: 2019-01-09

## 2019-01-10 ENCOUNTER — RECORDS - HEALTHEAST (OUTPATIENT)
Dept: ADMINISTRATIVE | Facility: OTHER | Age: 84
End: 2019-01-10

## 2019-01-20 ENCOUNTER — RECORDS - HEALTHEAST (OUTPATIENT)
Dept: ADMINISTRATIVE | Facility: OTHER | Age: 84
End: 2019-01-20

## 2019-01-20 ENCOUNTER — COMMUNICATION - HEALTHEAST (OUTPATIENT)
Dept: INTERNAL MEDICINE | Facility: CLINIC | Age: 84
End: 2019-01-20

## 2019-01-21 ENCOUNTER — COMMUNICATION - HEALTHEAST (OUTPATIENT)
Dept: INTERNAL MEDICINE | Facility: CLINIC | Age: 84
End: 2019-01-21

## 2019-01-21 DIAGNOSIS — R51.9 HEADACHE: ICD-10-CM

## 2019-02-28 ENCOUNTER — RECORDS - HEALTHEAST (OUTPATIENT)
Dept: ADMINISTRATIVE | Facility: OTHER | Age: 84
End: 2019-02-28

## 2019-03-14 ENCOUNTER — COMMUNICATION - HEALTHEAST (OUTPATIENT)
Dept: INTERNAL MEDICINE | Facility: CLINIC | Age: 84
End: 2019-03-14

## 2019-03-22 ENCOUNTER — COMMUNICATION - HEALTHEAST (OUTPATIENT)
Dept: INTERNAL MEDICINE | Facility: CLINIC | Age: 84
End: 2019-03-22

## 2019-03-22 DIAGNOSIS — G25.81 RESTLESS LEGS SYNDROME (RLS): ICD-10-CM

## 2019-04-05 ENCOUNTER — COMMUNICATION - HEALTHEAST (OUTPATIENT)
Dept: INTERNAL MEDICINE | Facility: CLINIC | Age: 84
End: 2019-04-05

## 2019-04-05 DIAGNOSIS — G47.00 INSOMNIA: ICD-10-CM

## 2019-05-08 ENCOUNTER — COMMUNICATION - HEALTHEAST (OUTPATIENT)
Dept: INTERNAL MEDICINE | Facility: CLINIC | Age: 84
End: 2019-05-08

## 2019-05-08 DIAGNOSIS — C18.0 MALIGNANT NEOPLASM OF CECUM (H): ICD-10-CM

## 2019-05-16 ENCOUNTER — COMMUNICATION - HEALTHEAST (OUTPATIENT)
Dept: INTERNAL MEDICINE | Facility: CLINIC | Age: 84
End: 2019-05-16

## 2019-05-16 DIAGNOSIS — K58.9 IRRITABLE BOWEL: ICD-10-CM

## 2019-06-03 ENCOUNTER — RECORDS - HEALTHEAST (OUTPATIENT)
Dept: ADMINISTRATIVE | Facility: OTHER | Age: 84
End: 2019-06-03

## 2019-06-21 ENCOUNTER — COMMUNICATION - HEALTHEAST (OUTPATIENT)
Dept: INTERNAL MEDICINE | Facility: CLINIC | Age: 84
End: 2019-06-21

## 2019-06-21 DIAGNOSIS — Z01.818 PREOP GENERAL PHYSICAL EXAM: ICD-10-CM

## 2019-06-22 ENCOUNTER — COMMUNICATION - HEALTHEAST (OUTPATIENT)
Dept: INTERNAL MEDICINE | Facility: CLINIC | Age: 84
End: 2019-06-22

## 2019-06-22 DIAGNOSIS — Z01.818 PREOP GENERAL PHYSICAL EXAM: ICD-10-CM

## 2019-07-22 ENCOUNTER — OFFICE VISIT - HEALTHEAST (OUTPATIENT)
Dept: INTERNAL MEDICINE | Facility: CLINIC | Age: 84
End: 2019-07-22

## 2019-07-22 DIAGNOSIS — F51.01 PRIMARY INSOMNIA: ICD-10-CM

## 2019-07-22 DIAGNOSIS — C50.212 MALIGNANT NEOPLASM OF UPPER-INNER QUADRANT OF LEFT FEMALE BREAST, UNSPECIFIED ESTROGEN RECEPTOR STATUS (H): ICD-10-CM

## 2019-07-22 DIAGNOSIS — G25.81 RESTLESS LEGS SYNDROME (RLS): ICD-10-CM

## 2019-07-22 DIAGNOSIS — M47.812 CERVICAL ARTHRITIS: ICD-10-CM

## 2019-07-22 DIAGNOSIS — S39.011A STRAIN OF ABDOMINAL MUSCLE, INITIAL ENCOUNTER: ICD-10-CM

## 2019-07-22 DIAGNOSIS — C18.0 MALIGNANT NEOPLASM OF CECUM (H): ICD-10-CM

## 2019-07-22 ASSESSMENT — MIFFLIN-ST. JEOR: SCORE: 940.77

## 2019-07-24 ENCOUNTER — COMMUNICATION - HEALTHEAST (OUTPATIENT)
Dept: INTERNAL MEDICINE | Facility: CLINIC | Age: 84
End: 2019-07-24

## 2019-07-24 DIAGNOSIS — G25.81 RESTLESS LEG SYNDROME: ICD-10-CM

## 2019-08-05 ENCOUNTER — COMMUNICATION - HEALTHEAST (OUTPATIENT)
Dept: CARE COORDINATION | Facility: CLINIC | Age: 84
End: 2019-08-05

## 2019-08-06 ENCOUNTER — OFFICE VISIT - HEALTHEAST (OUTPATIENT)
Dept: INTERNAL MEDICINE | Facility: CLINIC | Age: 84
End: 2019-08-06

## 2019-08-06 DIAGNOSIS — R55 NEAR SYNCOPE: ICD-10-CM

## 2019-08-06 ASSESSMENT — MIFFLIN-ST. JEOR: SCORE: 941.9

## 2019-09-03 ENCOUNTER — COMMUNICATION - HEALTHEAST (OUTPATIENT)
Dept: INTERNAL MEDICINE | Facility: CLINIC | Age: 84
End: 2019-09-03

## 2019-09-03 DIAGNOSIS — R55 NEAR SYNCOPE: ICD-10-CM

## 2019-09-26 ENCOUNTER — COMMUNICATION - HEALTHEAST (OUTPATIENT)
Dept: INTERNAL MEDICINE | Facility: CLINIC | Age: 84
End: 2019-09-26

## 2019-09-26 DIAGNOSIS — G47.00 INSOMNIA: ICD-10-CM

## 2019-10-28 ENCOUNTER — RECORDS - HEALTHEAST (OUTPATIENT)
Dept: ADMINISTRATIVE | Facility: OTHER | Age: 84
End: 2019-10-28

## 2019-11-25 ENCOUNTER — AMBULATORY - HEALTHEAST (OUTPATIENT)
Dept: INTERNAL MEDICINE | Facility: CLINIC | Age: 84
End: 2019-11-25

## 2019-11-25 ENCOUNTER — OFFICE VISIT - HEALTHEAST (OUTPATIENT)
Dept: INTERNAL MEDICINE | Facility: CLINIC | Age: 84
End: 2019-11-25

## 2019-11-25 DIAGNOSIS — Z78.0 MENOPAUSE: ICD-10-CM

## 2019-11-25 DIAGNOSIS — G25.81 RESTLESS LEG SYNDROME: ICD-10-CM

## 2019-11-25 DIAGNOSIS — M85.80 OSTEOPENIA, UNSPECIFIED LOCATION: ICD-10-CM

## 2019-11-25 DIAGNOSIS — Z12.31 VISIT FOR SCREENING MAMMOGRAM: ICD-10-CM

## 2019-11-25 DIAGNOSIS — G47.00 INSOMNIA: ICD-10-CM

## 2019-11-25 DIAGNOSIS — G43.909 MIGRAINE WITHOUT STATUS MIGRAINOSUS, NOT INTRACTABLE, UNSPECIFIED MIGRAINE TYPE: ICD-10-CM

## 2019-11-25 DIAGNOSIS — Z00.00 WELLNESS EXAMINATION: ICD-10-CM

## 2019-11-25 DIAGNOSIS — I10 BENIGN ESSENTIAL HYPERTENSION: ICD-10-CM

## 2019-11-25 DIAGNOSIS — J31.0 CHRONIC RHINITIS: ICD-10-CM

## 2019-11-25 ASSESSMENT — MIFFLIN-ST. JEOR: SCORE: 954.38

## 2019-12-03 ENCOUNTER — COMMUNICATION - HEALTHEAST (OUTPATIENT)
Dept: INTERNAL MEDICINE | Facility: CLINIC | Age: 84
End: 2019-12-03

## 2019-12-03 DIAGNOSIS — M17.9 OSTEOARTHRITIS OF KNEE, UNSPECIFIED LATERALITY, UNSPECIFIED OSTEOARTHRITIS TYPE: ICD-10-CM

## 2020-01-08 ENCOUNTER — COMMUNICATION - HEALTHEAST (OUTPATIENT)
Dept: INTERNAL MEDICINE | Facility: CLINIC | Age: 85
End: 2020-01-08

## 2020-01-27 ENCOUNTER — HOSPITAL ENCOUNTER (OUTPATIENT)
Dept: MAMMOGRAPHY | Facility: CLINIC | Age: 85
Discharge: HOME | End: 2020-01-27

## 2020-01-27 DIAGNOSIS — Z12.31 VISIT FOR SCREENING MAMMOGRAM: ICD-10-CM

## 2020-01-31 ENCOUNTER — HOSPITAL ENCOUNTER (OUTPATIENT)
Dept: MAMMOGRAPHY | Facility: CLINIC | Age: 85
Discharge: HOME | End: 2020-01-31

## 2020-01-31 DIAGNOSIS — N63.10 BREAST MASS, RIGHT: ICD-10-CM

## 2020-01-31 DIAGNOSIS — N64.59 ABNORMAL BREAST FINDING: ICD-10-CM

## 2020-07-09 ENCOUNTER — COMMUNICATION - HEALTHEAST (OUTPATIENT)
Dept: SCHEDULING | Facility: CLINIC | Age: 85
End: 2020-07-09

## 2020-07-09 ENCOUNTER — OFFICE VISIT - HEALTHEAST (OUTPATIENT)
Dept: INTERNAL MEDICINE | Facility: CLINIC | Age: 85
End: 2020-07-09

## 2020-07-09 DIAGNOSIS — L23.7 POISON IVY: ICD-10-CM

## 2020-11-06 ENCOUNTER — RECORDS - HEALTHEAST (OUTPATIENT)
Dept: ADMINISTRATIVE | Facility: OTHER | Age: 85
End: 2020-11-06

## 2020-11-27 ENCOUNTER — COMMUNICATION - HEALTHEAST (OUTPATIENT)
Dept: INTERNAL MEDICINE | Facility: CLINIC | Age: 85
End: 2020-11-27

## 2020-11-27 DIAGNOSIS — J31.0 CHRONIC RHINITIS: ICD-10-CM

## 2020-11-30 ENCOUNTER — COMMUNICATION - HEALTHEAST (OUTPATIENT)
Dept: INTERNAL MEDICINE | Facility: CLINIC | Age: 85
End: 2020-11-30

## 2020-11-30 DIAGNOSIS — J31.0 CHRONIC RHINITIS: ICD-10-CM

## 2021-02-04 ENCOUNTER — COMMUNICATION - HEALTHEAST (OUTPATIENT)
Dept: INTERNAL MEDICINE | Facility: CLINIC | Age: 86
End: 2021-02-04

## 2021-05-26 NOTE — TELEPHONE ENCOUNTER
Refill Approved    Rx renewed per Medication Renewal Policy. Medication was last renewed on 3/14/48823.           Francisco Larose, [facility] Connection Triage/Med Refill 3/23/2019     Requested Prescriptions   Pending Prescriptions Disp Refills     gabapentin (NEURONTIN) 800 MG tablet [Pharmacy Med Name: GABAPENTIN 800MG TABLETS] 90 tablet 0     Sig: TAKE 1 TABLET BY MOUTH EVERY DAY    Gabapentin/Levetiracetam/Tiagabine Refill Protocol  Passed - 3/22/2019  6:46 PM       Passed - PCP or prescribing provider visit in past 12 months or next 3 months    Last office visit with prescriber/PCP: 2/6/2018 Chang Simmons MD OR same dept: Visit date not found OR same specialty: 2/6/2018 Chang Simmons MD  Last physical: 12/3/2018 Last MTM visit: Visit date not found   Next visit within 3 mo: Visit date not found  Next physical within 3 mo: Visit date not found  Prescriber OR PCP: Chang Simmons MD  Last diagnosis associated with med order: 1. Restless Legs Syndrome  - gabapentin (NEURONTIN) 800 MG tablet [Pharmacy Med Name: GABAPENTIN 800MG TABLETS]; TAKE 1 TABLET BY MOUTH EVERY DAY  Dispense: 90 tablet; Refill: 0    If protocol passes may refill for 12 months if within 3 months of last provider visit (or a total of 15 months).

## 2021-05-27 NOTE — TELEPHONE ENCOUNTER
Refill Approved    Rx renewed per Medication Renewal Policy. Medication was last renewed on 12/13/2019.  Last office visit: .12/3/2018 with PCP Dr SHIMON Mata, Care Connection Triage/Med Refill 4/7/2019     Requested Prescriptions   Pending Prescriptions Disp Refills     traZODone (DESYREL) 50 MG tablet [Pharmacy Med Name: TRAZODONE 50MG TABLETS] 90 tablet 0     Sig: TAKE 1 TABLET(50 MG) BY MOUTH AT BEDTIME       Tricyclics/Misc Antidepressant/Antianxiety Meds Refill Protocol Passed - 4/5/2019 12:08 PM        Passed - PCP or prescribing provider visit in last year     Last office visit with prescriber/PCP: 2/6/2018 Chang Simmons MD OR same dept: Visit date not found OR same specialty: 2/6/2018 Chang Simmons MD  Last physical: 12/3/2018 Last MTM visit: Visit date not found   Next visit within 3 mo: Visit date not found  Next physical within 3 mo: Visit date not found  Prescriber OR PCP: Chang Simmons MD  Last diagnosis associated with med order: 1. Insomnia  - traZODone (DESYREL) 50 MG tablet [Pharmacy Med Name: TRAZODONE 50MG TABLETS]; TAKE 1 TABLET(50 MG) BY MOUTH AT BEDTIME  Dispense: 90 tablet; Refill: 0    If protocol passes may refill for 12 months if within 3 months of last provider visit (or a total of 15 months).

## 2021-05-28 NOTE — TELEPHONE ENCOUNTER
Controlled Substance Refill Request  Medication:   Requested Prescriptions     Pending Prescriptions Disp Refills     diphenoxylate-atropine (LOMOTIL) 2.5-0.025 mg per tablet [Pharmacy Med Name: DIPHENOXYLATE/ATROPINE TABLETS] 60 tablet 0     Sig: TAKE 1 TABLET BY MOUTH FOUR TIMES DAILY AS NEEDED FOR DIARRHEA     Date Last Fill: 2/6/18  Pharmacy: [id number]   Submit electronically to pharmacy  Controlled Substance Agreement on File:   Encounter-Level CSA Scan Date:    There are no encounter-level csa scan date.       Last office visit: Last office visit pertaining to requested medication was 12/3/18.

## 2021-05-28 NOTE — TELEPHONE ENCOUNTER
Medication Request  Medication name: Dicyclomine 20 mg   Pharmacy Name and Location: [facility] #[id number]  Reason for request: Patient has a history of Irritable Bowel Syndrome.  During flare up, Dr. Simmons gives her Dicyclomine.  She is having a flare and asking for this medication.  When did you use medication last?:  When last prescribed in 2017  Patient offered appointment:  patient declined  Okay to leave a detailed message: yes

## 2021-05-29 NOTE — TELEPHONE ENCOUNTER
RN cannot approve Refill Request    RN can NOT refill this medication as there is no associated diagnosis.  Unfortunately we will always have to send the request to you.. Last office visit: Visit date not found Last Physical: Visit date not found Last MTM visit: Visit date not found Last visit same specialty: 2/6/2018 Chang Simmons MD.  Next visit within 3 mo: Visit date not found  Next physical within 3 mo: Visit date not found  Last renewal 3/15/2019 for 90/0  Last OV 12/3/2018 pre-op    Cammy Sepulveda, Care Connection Triage/Med Refill 6/24/2019    Requested Prescriptions   Pending Prescriptions Disp Refills     irbesartan-hydrochlorothiazide (AVALIDE) 300-12.5 mg per tablet [Pharmacy Med Name: IRBESARTAN/HCTZ 300-12.5MG TABLETS] 90 tablet 3     Sig: TAKE 1 TABLET BY MOUTH EVERY DAY       Diuretics/Combination Diuretics Refill Protocol  Passed - 6/24/2019  6:06 AM        Passed - Visit with PCP or prescribing provider visit in past 12 months     Last office visit with prescriber/PCP: Visit date not found OR same dept: Visit date not found OR same specialty: 2/6/2018 Chang Simmons MD  Last physical: Visit date not found Last MTM visit: Visit date not found   Next visit within 3 mo: Visit date not found  Next physical within 3 mo: Visit date not found  Prescriber OR PCP: Connor Clifton MD  Last diagnosis associated with med order: There are no diagnoses linked to this encounter.  If protocol passes may refill for 12 months if within 3 months of last provider visit (or a total of 15 months).             Passed - Serum Potassium in past 12 months      Lab Results   Component Value Date    Potassium 3.8 12/03/2018             Passed - Serum Sodium in past 12 months      Lab Results   Component Value Date    Sodium 136 12/03/2018             Passed - Blood pressure on file in past 12 months     BP Readings from Last 1 Encounters:   01/02/19 144/71             Passed - Serum Creatinine in past 12 months       Creatinine   Date Value Ref Range Status   12/03/2018 0.73 0.60 - 1.10 mg/dL Final

## 2021-05-30 VITALS — HEIGHT: 59 IN | WEIGHT: 127 LBS | BODY MASS INDEX: 25.6 KG/M2

## 2021-05-30 VITALS — WEIGHT: 121 LBS | BODY MASS INDEX: 24.39 KG/M2 | HEIGHT: 59 IN

## 2021-05-30 VITALS — WEIGHT: 122 LBS | BODY MASS INDEX: 24.6 KG/M2 | HEIGHT: 59 IN

## 2021-05-30 VITALS — BODY MASS INDEX: 25.6 KG/M2 | WEIGHT: 127 LBS | HEIGHT: 59 IN

## 2021-05-30 NOTE — PROGRESS NOTES
"Office Visit - Follow up    Mitra Hall   84 y.o. female    Date of Visit: 7/22/2019    Chief Complaint   Patient presents with     Abdominal Pain     tenderness rt side abdomen feels like hernia. x6 days        Subjective: Mitra is here primarily with concerns regarding abdominal pain.  Previous history is as noted in enclosed both breast and colonic malignancy.  Also primary insomnia and restless leg syndrome as well as degenerative arthritis and history of migraine headaches    Current medications reviewed as noted and reconciled    Chief complaint is pain just to the right of the midline in the mid abdominal region.  She has recently started doing abdominal \"crunches\" the pain began 6 to 7 days ago on the right side and is quite localized and is made worse by movement primarily bending torso sit ups and things of this nature    Denies evidence of bruising denies any upper or lower GI symptoms or any obstructive symptoms.        ROS: A comprehensive review of systems was performed and was otherwise negative except as mentioned above.     Exam  Abdominal exam relatively benign with the exception of tenderness just to the right of the midline about 4 cm above umbilicus.  I am unable to demonstrate a abdominal hernia.  There is no evidence of bruising no mass no organomegaly modest tenderness without guarding or peritoneal signs    Vital signs as noted heart normal lungs clear extremities negative   /80 (Patient Site: Right Arm, Patient Position: Sitting, Cuff Size: Adult Regular)   Pulse 67   Resp 16   Ht 4' 11\" (1.499 m)   Wt 129 lb (58.5 kg)   LMP 06/20/1987   SpO2 98%   BMI 26.05 kg/m      Assessment and Plan  Diagnoses as noted above and below I do feel she has an abdominal wall strain I am unable to demonstrate a hernia.  Certainly no obstructive symptoms.    I have reassured her I have asked her to avoid sit ups crunches etc. and apply heat as needed.  She plans to go camping in 1 week.  If " pain is increasing or does not change she will call me prior to that.  Anticipate it will however be 4 to 6 weeks for complete healing advised activities which would be difficult for her by causing increased pain such as flexion and extension exercise    Mitra was seen today for abdominal pain.    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 Authorizing Provider   acetaminophen (TYLENOL) 650 MG CR tablet Take 650 mg by mouth every 8 (eight) hours as needed for pain.   Yes PROVIDER, HISTORICAL   amoxicillin (AMOXIL) 500 MG capsule Take 4 capsules 1 hour before procedure 8/23/18  Yes Chang Simmons MD   aspirin 81 MG EC tablet Take 81 mg by mouth daily.   Yes Chang Simmons MD   azelastine (ASTELIN) 137 mcg (0.1 %) nasal spray  6/25/15  Yes PROVIDER, HISTORICAL   butalbital-aspirin-caffeine (FIORINAL) -40 mg per capsule TAKE 1 CAPSULE BY MOUTH THREE TIMES DAILY IF NEEDED 1/21/19  Yes Chang Simmons MD   conjugated estrogens (PREMARIN) vaginal cream Insert 1 gram applicator daily. 11/30/18  Yes Chang Simmons MD   dicyclomine (BENTYL) 20 mg tablet Take 1 tablet (20 mg total) by mouth 3 (three) times a day as needed. 5/16/19  Yes Chang Simmons MD   diphenoxylate-atropine (LOMOTIL) 2.5-0.025 mg per tablet TAKE 1 TABLET BY MOUTH FOUR TIMES DAILY AS NEEDED FOR DIARRHEA 5/9/19  Yes Chang Simmons MD   fluticasone (FLONASE) 50 mcg/actuation nasal  spray 2 sprays into each nostril daily. 3/7/17  Yes Chang Simmons MD   gabapentin (NEURONTIN) 800 MG tablet TAKE 1 TABLET BY MOUTH EVERY DAY 3/23/19  Yes Chang Simmons MD   irbesartan-hydrochlorothiazide (AVALIDE) 300-12.5 mg per tablet TAKE 1 TABLET BY MOUTH EVERY DAY 6/18/18  Yes Chang Simmons MD   lansoprazole (PREVACID 24HR ORAL) Take by mouth daily.   Yes PROVIDER, HISTORICAL   LORazepam (ATIVAN) 0.5 MG tablet Take 1 tablet (0.5 mg total) by mouth bedtime as needed (sleep). 1/26/16  Yes Chang Simmons MD   MAGNESIUM ORAL Take by mouth.   Yes PROVIDER, HISTORICAL   naproxen sodium (ALEVE) 220 MG tablet Take 220 mg by mouth 2 (two) times a day.   Yes PROVIDER, HISTORICAL   pramipexole (MIRAPEX) 0.5 MG tablet TAKE 2 TABLETS(1 MG) BY MOUTH DAILY  Patient taking differently: TAKE 1 TABLET (1 MG) BY MOUTH DAILY 7/24/18  Yes Chang Simmons MD   SUMAtriptan (IMITREX) 25 MG tablet TAKE 1 TABLET BY MOUTH AS NEEDED FOR MIGRAINE 1/21/19  Yes Chang Simmons MD   traZODone (DESYREL) 50 MG tablet Take 1 tablet (50 mg total) by mouth at bedtime. 4/7/19  Yes Chang Simmons MD   irbesartan-hydrochlorothiazide (AVALIDE) 300-12.5 mg per tablet TAKE 1 TABLET BY MOUTH EVERY DAY 12/13/18   Chang Simmons MD   irbesartan-hydrochlorothiazide (AVALIDE) 300-12.5 mg per tablet TAKE 1 TABLET BY MOUTH EVERY DAY 6/24/19   Chang Simmons MD   irbesartan-hydrochlorothiazide (AVALIDE) 300-12.5 mg per tablet TAKE 1 TABLET BY MOUTH EVERY DAY 6/24/19   Chang Simmons MD        Past Medical History:   Past Medical History:   Diagnosis Date     Breast cancer (H) 1998    left mastectomy     Breast injury     10/2017 fell flat of chest while walking     Cancer (H)     adenocarcinoma of colon [place] B - 30 years ago     Cancer (H)     left breast ca 1998     Cataract      Colon cancer (H) 1983     Osteoporosis     mild     RLS (restless legs syndrome)        Past Surgical History:   Past Surgical History:   Procedure Laterality  Date     BREAST BIOPSY Left      COLECTOMY      [place]     MASTECTOMY Left     Ca     REDUCTION MAMMAPLASTY Right 2000    done after left mastect.       Social History:   Social History     Socioeconomic History     Marital status:      Spouse name: Not on file     Number of children: Not on file     Years of education: Not on file     Highest education level: Not on file   Occupational History     Not on file   Social Needs     Financial resource strain: Not on file     Food insecurity:     Worry: Not on file     Inability: Not on file     Transportation needs:     Medical: Not on file     Non-medical: Not on file   Tobacco Use     Smoking status: Former Smoker     Last attempt to quit: 1964     Years since quittin.6     Smokeless tobacco: Never Used   Substance and Sexual Activity     Alcohol use: Yes     Alcohol/week: 0.6 oz     Types: 1 Glasses of wine per week     Comment: 1 glass of wine/week     Drug use: No     Sexual activity: Not on file   Lifestyle     Physical activity:     Days per week: Not on file     Minutes per session: Not on file     Stress: Not on file   Relationships     Social connections:     Talks on phone: Not on file     Gets together: Not on file     Attends [employer] service: Not on file     Active member of club or organization: Not on file     Attends meetings of clubs or organizations: Not on file     Relationship status: Not on file     Intimate partner violence:     Fear of current or ex partner: Not on file     Emotionally abused: Not on file     Physically abused: Not on file     Forced sexual activity: Not on file   Other Topics Concern     Not on file   Social History Narrative     Not on file       Family History:   Family History   Problem Relation Age of Onset     Breast cancer Neg Hx          Chang Simmons MD

## 2021-05-30 NOTE — TELEPHONE ENCOUNTER
Patient Returning Call  Reason for call:  Caller returning call to check on the status of this request.  Information relayed to patient:  Pending providers review and approval.  Patient has additional questions:  yes  If YES, what are your questions/concerns:  I am leaving for out of town and I need this refilled today.   Okay to leave a detailed message?: Yes

## 2021-05-30 NOTE — TELEPHONE ENCOUNTER
Refill Approved    Rx renewed per Medication Renewal Policy. Medication was last renewed on 7/24/18.    Destinee Patel, Care Connection Triage/Med Refill 7/25/2019     Requested Prescriptions   Pending Prescriptions Disp Refills     pramipexole (MIRAPEX) 0.5 MG tablet [Pharmacy Med Name: PRAMIPEXOLE 0.5MG TABLETS] 180 tablet 0     Sig: TAKE 2 TABLETS(1 MG) BY MOUTH DAILY       Parkinson's Meds I Refill Protocol Passed - 7/25/2019 10:37 AM        Passed - PCP or prescribing provider visit in past 6 months      Last office visit with prescriber/PCP: 7/22/2019 OR same dept: 7/22/2019 Chang Simmons MD OR same specialty: 7/22/2019 Chang Simmons MD Last physical: Visit date not found Last MTM visit: Visit date not found     Next appt within 3 mo: Visit date not found  Next physical within 3 mo: Visit date not found  Prescriber OR PCP: Chang Simmons MD  Last diagnosis associated with med order: 1. Restless leg syndrome  - pramipexole (MIRAPEX) 0.5 MG tablet [Pharmacy Med Name: PRAMIPEXOLE 0.5MG TABLETS]; TAKE 2 TABLETS(1 MG) BY MOUTH DAILY  Dispense: 180 tablet; Refill: 0    If protocol passes may refill for 6 months if within 3 months of last provider visit (or a total of 9 months).

## 2021-05-31 VITALS — WEIGHT: 127 LBS | BODY MASS INDEX: 25.6 KG/M2 | HEIGHT: 59 IN

## 2021-05-31 VITALS — WEIGHT: 129 LBS | BODY MASS INDEX: 26.05 KG/M2

## 2021-05-31 VITALS — WEIGHT: 128 LBS | BODY MASS INDEX: 25.8 KG/M2 | HEIGHT: 59 IN

## 2021-05-31 VITALS — BODY MASS INDEX: 25.8 KG/M2 | HEIGHT: 59 IN | WEIGHT: 128 LBS

## 2021-05-31 NOTE — PROGRESS NOTES
"Office Visit - Follow up    Mitra Hall   84 y.o. female    Date of Visit: 8/6/2019    Chief Complaint   Patient presents with     Loss of Consciousness     on Saturday, feels good today       Subjective: Mitra is here for follow-up observational admission to [facility] after a syncopal episode.  Notes from the emergency room evaluation the history and physical by Dr. Hernandez and the discharge summary by Dr. Humza Ramos are reviewed.  Patient was at a wedding and had a near syncopal episode.  She is adamant that she did not completely lose consciousness.  Verified the facts with the history and physical and ER visit.  No discrepancies.  Or changes.    Evaluation included EKG laboratory studies and abdominal ultrasound.  No CT was performed    She was discharged in good condition without any residual symptoms.  Since that time has done well she has not had any new concerns regarding dizziness or lightheadedness    Current medications reviewed and reconciled    No recent change in regimen        ROS: A comprehensive review of systems was performed and was otherwise negative except as mentioned above.     Exam  Alert and oriented head and neck grossly normal no bruits no JVD lungs clear heart negative abdomen benign normal neuro negative   /74 (Patient Site: Right Arm, Patient Position: Sitting, Cuff Size: Adult Regular)   Pulse 65   Ht 4' 11\" (1.499 m)   Wt 129 lb 4 oz (58.6 kg)   LMP 06/20/1987   SpO2 96%   BMI 26.11 kg/m      Assessment and Plan  Near syncopal episode suspect mild volume depletion and other factors likely multifactorial.  No further sequelae continue to monitor closely as necessary    Mitra was seen today for loss of consciousness.    Diagnoses and all orders for this visit:    Near syncope          Time: total time spent with the patient was 30 minutes of which >50% was spent in counseling and coordination of care        Allergies   Allergen Reactions     " Hydrocodone-Acetaminophen Nausea And Vomiting       Medications :  Prior to Admission medications    Medication Sig Start Date End Date Taking? Authorizing Provider   acetaminophen (TYLENOL) 650 MG CR tablet Take 650 mg by mouth every 8 (eight) hours as needed for pain.   Yes PROVIDER, HISTORICAL   aspirin 81 MG EC tablet Take 81 mg by mouth daily.   Yes Chang Simmons MD   azelastine (ASTELIN) 137 mcg (0.1 %) nasal spray  6/25/15  Yes PROVIDER, HISTORICAL   butalbital-aspirin-caffeine (FIORINAL) -40 mg per capsule TAKE 1 CAPSULE BY MOUTH THREE TIMES DAILY IF NEEDED 1/21/19  Yes Chang Simmons MD   conjugated estrogens (PREMARIN) vaginal cream Insert 1 gram applicator daily. 11/30/18  Yes Chang Simmons MD   dicyclomine (BENTYL) 20 mg tablet Take 1 tablet (20 mg total) by mouth 3 (three) times a day as needed. 5/16/19  Yes Chang Simmons MD   diphenoxylate-atropine (LOMOTIL) 2.5-0.025 mg per tablet TAKE 1 TABLET BY MOUTH FOUR TIMES DAILY AS NEEDED FOR DIARRHEA 5/9/19  Yes Chang Simmons MD   fluticasone (FLONASE) 50 mcg/actuation nasal spray 2 sprays into each nostril daily. 3/7/17  Yes Chang Simmons MD   gabapentin (NEURONTIN) 800 MG tablet TAKE 1 TABLET BY MOUTH EVERY DAY 3/23/19  Yes Chang Simmons MD   irbesartan-hydrochlorothiazide (AVALIDE) 300-12.5 mg per tablet TAKE 1 TABLET BY MOUTH EVERY DAY 6/18/18  Yes Chang Simmons MD   lansoprazole (PREVACID 24HR ORAL) Take by mouth daily.   Yes PROVIDER, HISTORICAL   LORazepam (ATIVAN) 0.5 MG tablet Take 1 tablet (0.5 mg total) by mouth bedtime as needed (sleep). 1/26/16  Yes Chang Simmons MD   MAGNESIUM ORAL Take by mouth.   Yes PROVIDER, HISTORICAL   naproxen sodium (ALEVE) 220 MG tablet Take 220 mg by mouth 2 (two) times a day.   Yes PROVIDER, HISTORICAL   pramipexole (MIRAPEX) 0.5 MG tablet TAKE 2 TABLETS(1 MG) BY MOUTH DAILY 7/25/19  Yes Chang Simmons MD   SUMAtriptan (IMITREX) 25 MG tablet TAKE 1 TABLET BY MOUTH AS NEEDED FOR  MIGRAINE 19  Yes Chang Simmons MD   traZODone (DESYREL) 50 MG tablet Take 1 tablet (50 mg total) by mouth at bedtime. 19  Yes Chang Simmons MD        Past Medical History:   Past Medical History:   Diagnosis Date     Breast cancer (H) 1998    left mastectomy     Breast injury     10/2017 fell flat of chest while walking     Cancer (H)     adenocarcinoma of colon [place] - 30 years ago     Cancer (H)     left breast ca      Cataract      Colon cancer (H)      Osteoporosis     mild     RLS (restless legs syndrome)        Past Surgical History:   Past Surgical History:   Procedure Laterality Date     BREAST BIOPSY Left      COLECTOMY      [place] CA     MASTECTOMY Left     Ca     REDUCTION MAMMAPLASTY Right     done after left mastect.       Social History:   Social History     Socioeconomic History     Marital status:      Spouse name: Not on file     Number of children: Not on file     Years of education: Not on file     Highest education level: Not on file   Occupational History     Not on file   Social Needs     Financial resource strain: Not on file     Food insecurity:     Worry: Not on file     Inability: Not on file     Transportation needs:     Medical: Not on file     Non-medical: Not on file   Tobacco Use     Smoking status: Former Smoker     Last attempt to quit: 1964     Years since quittin.6     Smokeless tobacco: Never Used   Substance and Sexual Activity     Alcohol use: Yes     Alcohol/week: 0.6 oz     Types: 1 Glasses of wine per week     Comment: 1 glass of wine/week     Drug use: No     Sexual activity: Not on file   Lifestyle     Physical activity:     Days per week: Not on file     Minutes per session: Not on file     Stress: Not on file   Relationships     Social connections:     Talks on phone: Not on file     Gets together: Not on file     Attends [employer] service: Not on file     Active member of club or organization: Not on file      Attends meetings of clubs or organizations: Not on file     Relationship status: Not on file     Intimate partner violence:     Fear of current or ex partner: Not on file     Emotionally abused: Not on file     Physically abused: Not on file     Forced sexual activity: Not on file   Other Topics Concern     Not on file   Social History Narrative     Not on file       Family History:   Family History   Problem Relation Age of Onset     Breast cancer Neg Hx          Chang Simmons MD

## 2021-05-31 NOTE — PROGRESS NOTES
TCM DISCHARGE FOLLOW UP CALL    Discharge Date:  8/4/2019  Reason for hospital stay (discharge diagnosis)::  Syncope  Are you feeling better, the same or worse since your discharge?:  Patient is feeling better (Back to baseline. She thinks she might have been dehydrated. She doesn't check BPs at home.)  Do you feel like you have a plan in the event of a health emergency?: Yes ()    As part of your discharge plan, were  home care services ordered for you?: No    Did you receive any new medications, or was there a change to your medications?: No    Do you have any follow up visits scheduled with your PCP or Specialist?:  Yes, with PCP (8/6)  (RN) Is PCP appt scheduled soon enough (within 14 days of discharge date)?: Yes    RN NOTES::  Encouraged pt to increase fluid intake, especially on hot, humid days

## 2021-05-31 NOTE — PROGRESS NOTES
Hospital discharge follow up call to pt, no answer.  Left VM message reminding pt of appt on 8/6. RN will attempt call back at another time.

## 2021-06-01 VITALS — WEIGHT: 127.3 LBS | BODY MASS INDEX: 25.71 KG/M2

## 2021-06-01 VITALS — HEIGHT: 59 IN | WEIGHT: 127 LBS | BODY MASS INDEX: 25.6 KG/M2

## 2021-06-01 NOTE — TELEPHONE ENCOUNTER
Refill Approved    Rx renewed per Medication Renewal Policy. Medication was last renewed on 4/7/19.    Destinee Patel, [facility] Triage/Med Refill 9/27/2019     Requested Prescriptions   Pending Prescriptions Disp Refills     traZODone (DESYREL) 50 MG tablet [Pharmacy Med Name: TRAZODONE 50MG TABLETS] 90 tablet 0     Sig: TAKE 1 TABLET(50 MG) BY MOUTH AT BEDTIME       Tricyclics/Misc Antidepressant/Antianxiety Meds Refill Protocol Passed - 9/26/2019  3:26 PM        Passed - PCP or prescribing provider visit in last year     Last office visit with prescriber/PCP: 8/6/2019 Chang Simmons MD OR same dept: 8/6/2019 Chang Simmons MD OR same specialty: 8/6/2019 Chang Simmons MD  Last physical: 12/3/2018 Last MTM visit: Visit date not found   Next visit within 3 mo: Visit date not found  Next physical within 3 mo: Visit date not found  Prescriber OR PCP: Chang Simmons MD  Last diagnosis associated with med order: 1. Insomnia  - traZODone (DESYREL) 50 MG tablet [Pharmacy Med Name: TRAZODONE 50MG TABLETS]; TAKE 1 TABLET(50 MG) BY MOUTH AT BEDTIME  Dispense: 90 tablet; Refill: 0    If protocol passes may refill for 12 months if within 3 months of last provider visit (or a total of 15 months).

## 2021-06-02 VITALS — BODY MASS INDEX: 25.2 KG/M2 | WEIGHT: 125 LBS | HEIGHT: 59 IN

## 2021-06-02 VITALS — WEIGHT: 128.25 LBS | BODY MASS INDEX: 25.9 KG/M2

## 2021-06-03 VITALS — BODY MASS INDEX: 26 KG/M2 | HEIGHT: 59 IN | WEIGHT: 129 LBS

## 2021-06-03 VITALS
HEART RATE: 66 BPM | WEIGHT: 132 LBS | OXYGEN SATURATION: 94 % | BODY MASS INDEX: 26.61 KG/M2 | HEIGHT: 59 IN | DIASTOLIC BLOOD PRESSURE: 88 MMHG | SYSTOLIC BLOOD PRESSURE: 138 MMHG

## 2021-06-03 VITALS — WEIGHT: 129.25 LBS | HEIGHT: 59 IN | BODY MASS INDEX: 26.06 KG/M2

## 2021-06-03 NOTE — TELEPHONE ENCOUNTER
RN cannot approve Refill Request    RN can NOT refill this medication med is not covered by policy/route to provider. Last office visit: 8/6/2019 Chang Simmons MD Last Physical: 12/3/2018 Last MTM visit: Visit date not found Last visit same specialty: 8/6/2019 Chang Simmons MD.  Next visit within 3 mo: Visit date not found  Next physical within 3 mo: Visit date not found      Cherry Mendez, [facility] Triage/Med Refill 12/3/2019    Requested Prescriptions   Pending Prescriptions Disp Refills     amoxicillin (AMOXIL) 500 MG capsule [Pharmacy Med Name: AMOXICILLIN 500MG CAPSULES] 12 capsule 0     Sig: TAKE 4 CAPSULES BY MOUTH 1 HOUR BEFORE PROCEDURE       There is no refill protocol information for this order

## 2021-06-03 NOTE — PATIENT INSTRUCTIONS - HE
Advance Directive  Patient s advance directive was discussed and I am comfortable with the patient s wishes.  Patient Education   Personalized Prevention Plan  You are due for the preventive services outlined below.  Your care team is available to assist you in scheduling these services.  If you have already completed any of these items, please share that information with your care team to update in your medical record.  Health Maintenance   Topic Date Due     DXA SCAN  02/12/2002     ZOSTER VACCINES (2 of 3) 12/20/2007     PNEUMOCOCCAL IMMUNIZATION 65+ LOW/MEDIUM RISK (2 of 2 - PPSV23) 03/04/2016     ADVANCE CARE PLANNING  03/04/2020     MEDICARE ANNUAL WELLNESS VISIT  11/25/2020     FALL RISK ASSESSMENT  11/25/2020     TD 18+ HE  08/09/2022     INFLUENZA VACCINE RULE BASED  Completed

## 2021-06-03 NOTE — PROGRESS NOTES
Assessment and Plan:     1. Wellness examination  Her examination is satisfactory today.  We did review the documentation regarding her wellness evaluation.  She has no difficulties with ADLs and no history of falling.  Memory screening is normal and she has an advanced directive.  She is due for bone density screening as well as a repeat mammogram and will set those up.  Otherwise age-appropriate health maintenance was reviewed and is up-to-date.    2. Migraine without status migrainosus, not intractable, unspecified migraine type  She has very infrequent migraine headaches.  We will continue with sumatriptan as needed.  - SUMAtriptan (IMITREX) 25 MG tablet; Take 1 tablet (25 mg total) by mouth as needed for migraine.  Dispense: 12 tablet; Refill: 3    3. Benign essential hypertension  Blood pressures well controlled with her current medications.  She had electrolytes and kidney function done just a couple months ago and it was normal.  She is not having any symptoms of chest pain or palpitations.  - irbesartan-hydrochlorothiazide (AVALIDE) 300-12.5 mg per tablet; Take 1 tablet by mouth daily.  Dispense: 90 tablet; Refill: 3    4. Restless leg syndrome  Symptoms are very well controlled with her current medications.  - pramipexole (MIRAPEX) 0.5 MG tablet; Take 1 tablet (0.5 mg total) by mouth daily.  Dispense: 90 tablet; Refill: 3  - gabapentin (NEURONTIN) 800 MG tablet; Take 1 tablet (800 mg total) by mouth daily.  Dispense: 90 tablet; Refill: 3    5. Insomnia  - traZODone (DESYREL) 50 MG tablet; Take 1 tablet (50 mg total) by mouth at bedtime.  Dispense: 90 tablet; Refill: 3    6. Chronic rhinitis  - fluticasone propionate (FLONASE) 50 mcg/actuation nasal spray; 2 sprays into each nostril daily.  Dispense: 16 g; Refill: 11    7. Osteopenia, unspecified location  - DXA Bone Density Scan; Future    8. Menopause  - DXA Bone Density Scan; Future    9. Visit for screening mammogram  - Mammo Screening Bilateral;  Future    The patient's current medical problems were reviewed.    I have had an Advance Directives discussion with the patient.  The following health maintenance schedule was reviewed with the patient and provided in printed form in the after visit summary:   Health Maintenance   Topic Date Due     DXA SCAN  02/12/2002     ZOSTER VACCINES (2 of 3) 12/20/2007     PNEUMOCOCCAL IMMUNIZATION 65+ LOW/MEDIUM RISK (2 of 2 - PPSV23) 03/04/2016     ADVANCE CARE PLANNING  03/04/2020     MEDICARE ANNUAL WELLNESS VISIT  11/25/2020     FALL RISK ASSESSMENT  11/25/2020     TD 18+ HE  08/09/2022     INFLUENZA VACCINE RULE BASED  Completed        Subjective:   Chief Complaint: Mitra Hall is an 84 y.o. female here for an Annual Wellness visit.   HPI: This is an 84-year-old female who comes in for her annual exam.  She is due for a mammogram and bone density screening.  She is otherwise up-to-date on age-appropriate health maintenance.  She has a history of breast cancer, colon cancer, and hypertension.  These are all well controlled.  She has no acute concerns today.  She is up-to-date on vaccinations and lab tests.  We did review the documentation for her wellness evaluation and she has no problems with falling or ADLs.  Her memory is satisfactory and she has an advanced directive.    Review of Systems:   Please see above.  The rest of the review of systems are negative for all systems.    Patient Care Team:  Chang Simmons MD as PCP - General  Chang Simmons MD as Assigned PCP     Patient Active Problem List   Diagnosis     Adenocarcinoma Of The Cecum     Breast Cancer     Restless Legs Syndrome     Migraine Headache     Osteoporosis     Irritable bowel     Insomnia     Osteoarthritis, knee     Cervical stenosis of spine     Past Medical History:   Diagnosis Date     Breast cancer (H) 1998    left mastectomy     Breast injury     10/2017 fell flat of chest while walking     Cancer (H)     adenocarcinoma of colon [place] B  - 30 years ago     Cancer (H)     left breast ca      Cataract      Colon cancer (H) 1983     Hx of total knee arthroplasty 2017     Osteoporosis     mild     RLS (restless legs syndrome)      Rotator Cuff Tendonitis     Created by Conversion  Replacement Utility updated for latest IMO load     Syncope 8/3/2019      Past Surgical History:   Procedure Laterality Date     BREAST BIOPSY Left      COLECTOMY      [place] B CA     MASTECTOMY Left     Ca     REDUCTION MAMMAPLASTY Right 2000    done after left mastect.      Family History   Problem Relation Age of Onset     Breast cancer Neg Hx       Social History     Socioeconomic History     Marital status:      Spouse name: Not on file     Number of children: Not on file     Years of education: Not on file     Highest education level: Not on file   Occupational History     Not on file   Social Needs     Financial resource strain: Not on file     Food insecurity:     Worry: Not on file     Inability: Not on file     Transportation needs:     Medical: Not on file     Non-medical: Not on file   Tobacco Use     Smoking status: Former Smoker     Last attempt to quit: 1964     Years since quittin.9     Smokeless tobacco: Never Used   Substance and Sexual Activity     Alcohol use: Yes     Alcohol/week: 1.0 standard drinks     Types: 1 Glasses of wine per week     Comment: 1 glass of wine/week     Drug use: No     Sexual activity: Not on file   Lifestyle     Physical activity:     Days per week: Not on file     Minutes per session: Not on file     Stress: Not on file   Relationships     Social connections:     Talks on phone: Not on file     Gets together: Not on file     Attends [employer] service: Not on file     Active member of club or organization: Not on file     Attends meetings of clubs or organizations: Not on file     Relationship status: Not on file     Intimate partner violence:     Fear of current or ex partner: Not on file      Emotionally abused: Not on file     Physically abused: Not on file     Forced sexual activity: Not on file   Other Topics Concern     Not on file   Social History Narrative     Not on file      Current Outpatient Medications   Medication Sig Dispense Refill     acetaminophen (TYLENOL) 650 MG CR tablet Take 650 mg by mouth every 8 (eight) hours as needed for pain.       aspirin 81 MG EC tablet Take 81 mg by mouth daily.       azelastine (ASTELIN) 137 mcg (0.1 %) nasal spray        butalbital-aspirin-caffeine (FIORINAL) -40 mg per capsule TAKE 1 CAPSULE BY MOUTH THREE TIMES DAILY IF NEEDED 20 capsule 0     conjugated estrogens (PREMARIN) vaginal cream Insert 1 gram applicator daily. 30 g 2     diphenoxylate-atropine (LOMOTIL) 2.5-0.025 mg per tablet TAKE 1 TABLET BY MOUTH FOUR TIMES DAILY AS NEEDED FOR DIARRHEA 60 tablet 0     fluticasone (FLONASE) 50 mcg/actuation nasal spray 2 sprays into each nostril daily. 16 g 11     gabapentin (NEURONTIN) 800 MG tablet TAKE 1 TABLET BY MOUTH EVERY DAY 90 tablet 3     irbesartan-hydrochlorothiazide (AVALIDE) 300-12.5 mg per tablet TAKE 1 TABLET BY MOUTH EVERY DAY 90 tablet 0     lansoprazole (PREVACID 24HR ORAL) Take by mouth daily.       LORazepam (ATIVAN) 0.5 MG tablet Take 1 tablet (0.5 mg total) by mouth bedtime as needed (sleep). 30 tablet 2     naproxen sodium (ALEVE) 220 MG tablet Take 220 mg by mouth 2 (two) times a day.       pramipexole (MIRAPEX) 0.5 MG tablet TAKE 2 TABLETS(1 MG) BY MOUTH DAILY (Patient taking differently: Take 0.5 mg by mouth daily. ) 180 tablet 1     SUMAtriptan (IMITREX) 25 MG tablet TAKE 1 TABLET BY MOUTH AS NEEDED FOR MIGRAINE 12 tablet 0     traZODone (DESYREL) 50 MG tablet TAKE 1 TABLET(50 MG) BY MOUTH AT BEDTIME 90 tablet 3     dicyclomine (BENTYL) 20 mg tablet Take 1 tablet (20 mg total) by mouth 3 (three) times a day as needed. 60 tablet 3     No current facility-administered medications for this visit.       Objective:   Vital Signs:  "  Visit Vitals  /88 (Patient Site: Right Arm, Patient Position: Sitting)   Pulse 66   Ht 4' 11\" (1.499 m)   Wt 132 lb (59.9 kg)   LMP 06/20/1987   SpO2 94%   BMI 26.66 kg/m         VisionScreening:  No exam data present     PHYSICAL EXAM  General:  Patient is alert and in no apparent distress.  Neck:  Supple with no adenopathy or thyroid abnormality noted.  Breast:  Normal breast tissue on the right.  She is status post mastectomy on the left.  Cardiovascular:  Regular rate and rhythm, normal S1/S2, no murmurs, rubs, or gallop.  Pulmonary:  Lungs are clear to auscultation bilaterally with normal respiratory effort.  Gastrointestinal:  Abdomen is soft, non-tender, non-distended, with no organomegaly, rebound or guarding.  Extremities:  No joint abnormalities with no LE edema.  Strong dorsalis pedis pulses.  Neurologic Cranial nerves are intact.  No focal deficits.  Psychiatric:  Pleasant, no confusion or agitation   Skin:  Warm and well perfused with no rashes or abnormalities.        Assessment Results 11/25/2019   Activities of Daily Living No help needed   Instrumental Activities of Daily Living No help needed   Get Up and Go Score Less than 12 seconds   Mini Cog Total Score 4   Some recent data might be hidden     A Mini-Cog score of 0-2 suggests the possibility of dementia, score of 3-5 suggests no dementia    Identified Health Risks:     Patient's advanced directive was discussed and I am comfortable with the patient's wishes.        "

## 2021-06-05 NOTE — TELEPHONE ENCOUNTER
Please let her know that it isn't uncommon for people to have periods of higher blood pressures which can be related to dietary changes, illness, stress, etc.  I recommend she continue to monitor over the next 1-2 weeks.  If it continues to be higher than 135/85 most of the time, then I'd recommend she come in for an appointment so that we can evaluate further.  Thanks.

## 2021-06-05 NOTE — TELEPHONE ENCOUNTER
Medication Question or Clarification  Who is calling: patient  What medication are you calling about (include dose and sig)?:     irbesartan-hydrochlorothiazide (AVALIDE) 300-12.5 mg per tablet 90 tablet 3 11/25/2019     Sig - Route: Take 1 tablet by mouth daily. - Oral      Who prescribed the medication?: Vane Blount MD  What is your question/concern?: Patient wanting to notify provider of her blood pressure readings. Patient reports they are a little high for her.Denies any symptoms.Please advise and call patient.  1/2/2020   175/89, 136/79,152/79  1/3/2020   152/104, 123/79, 132/78  1/5/20/20   149/112, 137/87  1/7/2020    128/84, 150/86, 149/88,                   147/84,130/82  1/8/2020    141/84  Requested Pharmacy: [facility] to leave a detailed message?: Yes

## 2021-06-08 NOTE — PROGRESS NOTES
"Office Visit - Follow up    Mitra Hall   81 y.o. female    Date of Visit: 1/3/2017    Chief Complaint   Patient presents with     Follow-up     Problems with stools, urgency       Subjective: Mitra has a history of both breast cancer and colon cancer these have been stable a colonoscopy performed last March revealed a single adenomatous polyp without other abnormal findings.  Also has history of restless leg syndrome intermittent migraine have basic generalized arthritis with rotator cuff tendinitis and bursitis as well as osteoporosis    Current medications reviewed as noted    She does complain of somewhat vague concerns regarding a change in bowel habits    History of mild irritable bowel syndrome with some constipation.  She notes a sense of defecation urgency in addition to some problems with increased gas and bloating.  Denies recent problems with melena or hematochezia.  No symptoms of weight loss no abdominal pain or symptoms of diverticulitis.        ROS: A comprehensive review of systems was performed and was otherwise negative except as mentioned above.     Exam  Abdominal exam is entirely normal no masses bowel sounds normal no tenderness or guarding.    Heart and lungs negative         Visit Vitals     /70     Pulse 60     Ht 4' 11\" (1.499 m)     Wt 121 lb (54.9 kg)     LMP 06/20/1987     BMI 24.44 kg/m2       Assessment and Plan   Laboratory studies were performed last year she is due for a physical and we'll plan fasting physical in near future.  Discussed use of both dicyclomine and Lomotil for her symptoms with plan to follow-up in the next several weeks no other change in regimen    Mitra was seen today for follow-up.    Diagnoses and all orders for this visit:    Other irritable bowel syndrome    Defecation urgency    Degenerative joint disease (DJD) of lumbar spine    DJD (degenerative joint disease) of knee    Other orders  -     dicyclomine (BENTYL) 20 mg tablet; Take 1 tablet (20 mg " total) by mouth 3 (three) times a day as needed.          Time: total time spent with the patient was 15 minutes of which >50% was spent in counseling and coordination of care        No Known Allergies    Medications :  Prior to Admission medications    Medication Sig Start Date End Date Taking? Authorizing Provider   acetaminophen (TYLENOL) 650 MG CR tablet Take 650 mg by mouth every 8 (eight) hours as needed for pain.   Yes PROVIDER, HISTORICAL   aspirin 81 MG EC tablet Take 81 mg by mouth daily.   Yes Chang Simmons MD   azelastine (ASTELIN) 137 mcg (0.1 %) nasal spray  6/25/15  Yes PROVIDER, HISTORICAL   butalbital-aspirin-caffeine (FIORINAL) -40 mg per capsule TAKE 1 CAPSULE BY MOUTH THREE TIMES DAILY IF NEEDED 12/9/16  Yes Chang Simmons MD   diphenoxylate-atropine (LOMOTIL) 2.5-0.025 mg per tablet Take 1 tablet by mouth 4 (four) times a day as needed for diarrhea.   Yes PROVIDER, HISTORICAL   fluticasone (FLONASE) 50 mcg/actuation nasal spray 1 spray into each nostril daily.   Yes PROVIDER, HISTORICAL   gabapentin (NEURONTIN) 800 MG tablet TAKE 1 TABLET BY MOUTH EVERY DAY 12/24/16  Yes Chang Valadez MD   irbesartan-hydrochlorothiazide (AVALIDE) 300-12.5 mg per tablet TAKE 1 TABLET BY MOUTH EVERY DAY 12/9/16  Yes Chang Simmons MD   LORazepam (ATIVAN) 0.5 MG tablet Take 1 tablet (0.5 mg total) by mouth bedtime as needed (sleep). 1/26/16  Yes Chang Simmons MD   omeprazole (PRILOSEC) 20 MG capsule Take 20 mg by mouth daily.   Yes PROVIDER, HISTORICAL   pramipexole (MIRAPEX) 0.5 MG tablet TAKE 2 TABLETS BY MOUTH EVERY DAY 2/7/16  Yes Chang Simmons MD   PREMARIN vaginal cream USE AS DIRECTED 10/18/16  Yes Chang Simmons MD   SUMAtriptan (IMITREX) 25 MG tablet TAKE 1 TABLET BY MOUTH AS NEEDED FOR MIGRAINE 8/8/16  Yes Chang Simmons MD   traZODone (DESYREL) 50 MG tablet TAKE 1 TABLET(50 MG) BY MOUTH EVERY NIGHT AT BEDTIME 7/19/16  Yes Fredi Levine MD   dicyclomine (BENTYL) 20 mg tablet Take 1  tablet (20 mg total) by mouth 3 (three) times a day as needed. 1/3/17 1/3/18  Chang Simmons MD   naproxen sodium (ALEVE) 220 MG tablet Take 220 mg by mouth 2 (two) times a day.    PROVIDER, HISTORICAL        Past Medical History:   Past Medical History   Diagnosis Date     Breast cancer 1998     left mastectomy     Cancer      adenocarcinoma of colon [place] [facility] - 30 years ago     Cancer      left breast ca 1998     Cataract      Colon cancer 1983     Osteoporosis      mild     RLS (restless legs syndrome)        Past Surgical History:   Past Surgical History   Procedure Laterality Date     Colectomy       [place] [facility] CA     Reduction mammaplasty Right 2000     done after left mastect.     Breast biopsy Left 1998     Mastectomy Left 1998     Ca       Social History:   Social History     Social History     Marital status:      Spouse name: N/A     Number of children: N/A     Years of education: N/A     Occupational History     Not on file.     Social History Main Topics     Smoking status: Former Smoker     Quit date: 12/16/1964     Smokeless tobacco: Not on file     Alcohol use 0.6 oz/week     1 Glasses of wine per week      Comment: 1 glass of wine/week     Drug use: No     Sexual activity: Not on file     Other Topics Concern     Not on file     Social History Narrative       Family History: No family history on file.      Chang Simmons MD

## 2021-06-09 NOTE — PROGRESS NOTES
"Office Visit - Follow up    Mitra Hall   82 y.o. female    Date of Visit: 3/7/2017    Chief Complaint   Patient presents with     Follow-up     2 month follow up       Subjective: Here for regular follow-up history of adenocarcinoma the cecum without recurrence history of breast cancer without recurrence    Intermittent problems with irritable bowel syndrome and some urgency of defecation.  We have used combination of bulk laxative and dicyclomine.    Blood sugar was a bit high last time at 150 has always been normal although she is questioning this.  I did reassure her we are rechecking an A1c    History of restless leg syndrome stable    Current medications reviewed discussed and reconciled no new changes questions or concerns    Headaches have been stable        ROS: A comprehensive review of systems was performed and was otherwise negative except as mentioned above.     Exam  Heart lungs and abdomen negative skin normal extremities unremarkable         Visit Vitals     /80     Pulse 78     Ht 4' 11\" (1.499 m)     Wt 127 lb (57.6 kg)     LMP 06/20/1987     BMI 25.65 kg/m2       Assessment and Plan   Will review labs when available no change in regimen gave prescription for trazodone for insomnia discussed instructions regarding this as well    Mitra was seen today for follow-up.    Diagnoses and all orders for this visit:    Defecation urgency  -     HM2(CBC w/o Differential); Future  -     Comprehensive Metabolic Panel; Future  -     Lipid Cascade  -     Glycosylated Hemoglobin A1c  -     HM2(CBC w/o Differential)  -     Comprehensive Metabolic Panel    Adenocarcinoma Of The Cecum  -     HM2(CBC w/o Differential); Future  -     Comprehensive Metabolic Panel; Future  -     Lipid Cascade  -     Glycosylated Hemoglobin A1c  -     HM2(CBC w/o Differential)  -     Comprehensive Metabolic Panel    Malignant neoplasm of female breast  -     HM2(CBC w/o Differential); Future  -     Comprehensive Metabolic " Panel; Future  -     Lipid Cascade  -     Glycosylated Hemoglobin A1c  -     HM2(CBC w/o Differential)  -     Comprehensive Metabolic Panel    Restless Legs Syndrome  -     HM2(CBC w/o Differential); Future  -     Comprehensive Metabolic Panel; Future  -     Lipid Cascade  -     Glycosylated Hemoglobin A1c  -     HM2(CBC w/o Differential)  -     Comprehensive Metabolic Panel    Medication management  -     HM2(CBC w/o Differential); Future  -     Comprehensive Metabolic Panel; Future  -     Lipid Cascade  -     Glycosylated Hemoglobin A1c  -     HM2(CBC w/o Differential)  -     Comprehensive Metabolic Panel    Hyperglycemia  -     HM2(CBC w/o Differential); Future  -     Comprehensive Metabolic Panel; Future  -     Lipid Cascade  -     Glycosylated Hemoglobin A1c  -     HM2(CBC w/o Differential)  -     Comprehensive Metabolic Panel    Other orders  -     fluticasone (FLONASE) 50 mcg/actuation nasal spray; 2 sprays into each nostril daily.          Time: total time spent with the patient was 25 minutes of which >50% was spent in counseling and coordination of care        No Known Allergies    Medications :  Prior to Admission medications    Medication Sig Start Date End Date Taking? Authorizing Provider   acetaminophen (TYLENOL) 650 MG CR tablet Take 650 mg by mouth every 8 (eight) hours as needed for pain.   Yes PROVIDER, HISTORICAL   aspirin 81 MG EC tablet Take 81 mg by mouth daily.   Yes Chang Simmons MD   azelastine (ASTELIN) 137 mcg (0.1 %) nasal spray  6/25/15  Yes PROVIDER, HISTORICAL   butalbital-aspirin-caffeine (FIORINAL) -40 mg per capsule TAKE 1 CAPSULE BY MOUTH THREE TIMES DAILY IF NEEDED 12/9/16  Yes Chang Simmons MD   dicyclomine (BENTYL) 20 mg tablet Take 1 tablet (20 mg total) by mouth 3 (three) times a day as needed. 1/3/17 1/3/18 Yes Chang Simmons MD   diphenoxylate-atropine (LOMOTIL) 2.5-0.025 mg per tablet Take 1 tablet by mouth 4 (four) times a day as needed for diarrhea.   Yes  PROVIDER, HISTORICAL   fluticasone (FLONASE) 50 mcg/actuation nasal spray 2 sprays into each nostril daily. 3/7/17  Yes Chang Simmons MD   gabapentin (NEURONTIN) 800 MG tablet TAKE 1 TABLET BY MOUTH EVERY DAY 12/24/16  Yes Chang Valadez MD   LORazepam (ATIVAN) 0.5 MG tablet Take 1 tablet (0.5 mg total) by mouth bedtime as needed (sleep). 1/26/16  Yes Chang Simmons MD   naproxen sodium (ALEVE) 220 MG tablet Take 220 mg by mouth 2 (two) times a day.   Yes PROVIDER, HISTORICAL   omeprazole (PRILOSEC) 20 MG capsule Take 20 mg by mouth daily.   Yes PROVIDER, HISTORICAL   pramipexole (MIRAPEX) 0.5 MG tablet TAKE 2 TABLETS BY MOUTH EVERY DAY 2/7/16  Yes Chang Simmons MD   PREMARIN vaginal cream USE AS DIRECTED 10/18/16  Yes Chang Simmons MD   SUMAtriptan (IMITREX) 25 MG tablet TAKE 1 TABLET BY MOUTH AS NEEDED FOR MIGRAINE 8/8/16  Yes Chang Simmons MD   traZODone (DESYREL) 50 MG tablet TAKE 1 TABLET(50 MG) BY MOUTH AT BEDTIME 2/8/17  Yes Chang Simmons MD   fluticasone (FLONASE) 50 mcg/actuation nasal spray 1 spray into each nostril daily.  3/7/17 Yes PROVIDER, HISTORICAL   traZODone (DESYREL) 50 MG tablet TAKE 1 TABLET(50 MG) BY MOUTH EVERY NIGHT AT BEDTIME 7/19/16 3/7/17 Yes Fredi Levine MD   irbesartan-hydrochlorothiazide (AVALIDE) 300-12.5 mg per tablet TAKE 1 TABLET BY MOUTH EVERY DAY 3/7/17   Chang Simmons MD        Past Medical History:   Past Medical History:   Diagnosis Date     Breast cancer 1998    left mastectomy     Cancer     adenocarcinoma of colon [place] [facility] - 30 years ago     Cancer     left breast ca 1998     Cataract      Colon cancer 1983     Osteoporosis     mild     RLS (restless legs syndrome)        Past Surgical History:   Past Surgical History:   Procedure Laterality Date     BREAST BIOPSY Left 1998     COLECTOMY      [place] [facility] CA     MASTECTOMY Left 1998    Ca     REDUCTION MAMMAPLASTY Right 2000    done after left mastect.       Social History:   Social History     Social History      Marital status:      Spouse name: N/A     Number of children: N/A     Years of education: N/A     Occupational History     Not on file.     Social History Main Topics     Smoking status: Former Smoker     Quit date: 12/16/1964     Smokeless tobacco: Not on file     Alcohol use 0.6 oz/week     1 Glasses of wine per week      Comment: 1 glass of wine/week     Drug use: No     Sexual activity: Not on file     Other Topics Concern     Not on file     Social History Narrative       Family History: No family history on file.      Chang Simmons MD
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
## 2021-06-09 NOTE — PROGRESS NOTES
Mitra Hall is a 85 y.o. female who is being evaluated via a billable video visit.        Video visit   Mitra Hall   85 y.o. female    Date of Visit: 7/9/2020    Chief Complaint   Patient presents with     Poison Ivy     Red and itchy for 5 days        Assessment and Plan   1. Poison ivy  She has significant poison ivy reaction on her arms and legs.  It does not seem to be improving.  I do not see any signs through the video of cellulitis.  I will go ahead and start her on a Medrol Dosepak and triamcinolone cream twice daily.  She is going to keep a close eye out for any signs of cellulitis.  Also if it is not improving she will let us know.  She is in agreement with this plan.  - methylPREDNISolone (MEDROL DOSEPACK) 4 mg tablet; Follow package directions  Dispense: 21 tablet; Refill: 0  - triamcinolone (KENALOG) 0.1 % cream; Apply twice daily to rash.  Dispense: 30 g; Refill: 0       No follow-ups on file.     History of Present Illness   This 85 y.o. old female is evaluated with a video visit.  She fell from her bike about 5 days prior to this visit into a patch of poison ivy.  She developed a significant rash on her arms and legs.  Seems to be worsening over the last week.  She is tried multiple topical treatments as well as Benadryl at that has not been helpful.  She does not feel that she has developed any infection.  No fevers or chills or other symptoms.  She has no other acute concerns today and did not have any injuries with her fall.    Review of Systems: As above, systems otherwise reviewed and negative.     Medications, Allergies and Problem List   Patient Active Problem List   Diagnosis     Adenocarcinoma Of The Cecum     Breast Cancer     Restless Legs Syndrome     Migraine Headache     Osteoporosis     Irritable bowel     Osteoarthritis, knee     Cervical stenosis of spine     Chronic rhinitis     Current Outpatient Medications   Medication Sig Dispense Refill     acetaminophen (TYLENOL)  650 MG CR tablet Take 650 mg by mouth every 8 (eight) hours as needed for pain.       aspirin 81 MG EC tablet Take 81 mg by mouth daily.       azelastine (ASTELIN) 137 mcg (0.1 %) nasal spray        butalbital-aspirin-caffeine (FIORINAL) -40 mg per capsule TAKE 1 CAPSULE BY MOUTH THREE TIMES DAILY IF NEEDED 20 capsule 0     conjugated estrogens (PREMARIN) vaginal cream Insert 1 gram applicator daily. 30 g 2     dicyclomine (BENTYL) 20 mg tablet Take 1 tablet (20 mg total) by mouth 3 (three) times a day as needed. 60 tablet 3     diphenoxylate-atropine (LOMOTIL) 2.5-0.025 mg per tablet TAKE 1 TABLET BY MOUTH FOUR TIMES DAILY AS NEEDED FOR DIARRHEA 60 tablet 0     fluticasone propionate (FLONASE) 50 mcg/actuation nasal spray 2 sprays into each nostril daily. 16 g 11     gabapentin (NEURONTIN) 800 MG tablet Take 1 tablet (800 mg total) by mouth daily. 90 tablet 3     irbesartan-hydrochlorothiazide (AVALIDE) 300-12.5 mg per tablet Take 1 tablet by mouth daily. 90 tablet 3     lansoprazole (PREVACID 24HR ORAL) Take by mouth daily.       LORazepam (ATIVAN) 0.5 MG tablet Take 1 tablet (0.5 mg total) by mouth bedtime as needed (sleep). 30 tablet 2     naproxen sodium (ALEVE) 220 MG tablet Take 220 mg by mouth 2 (two) times a day.       pramipexole (MIRAPEX) 0.5 MG tablet Take 1 tablet (0.5 mg total) by mouth daily. 90 tablet 3     SUMAtriptan (IMITREX) 25 MG tablet Take 1 tablet (25 mg total) by mouth as needed for migraine. 12 tablet 3     traZODone (DESYREL) 50 MG tablet Take 1 tablet (50 mg total) by mouth at bedtime. 90 tablet 3     amoxicillin (AMOXIL) 500 MG capsule TAKE 4 CAPSULES BY MOUTH 1 HOUR BEFORE PROCEDURE 12 capsule 0     methylPREDNISolone (MEDROL DOSEPACK) 4 mg tablet Follow package directions 21 tablet 0     triamcinolone (KENALOG) 0.1 % cream Apply twice daily to rash. 30 g 0     No current facility-administered medications for this visit.      Allergies   Allergen Reactions      "Hydrocodone-Acetaminophen Nausea And Vomiting          Physical Exam       General: This is an alert female, sitting comfortably, no apparent distress.  Skin: She shows me through the phone video some areas on her arms that show papules with surrounding redness.  There are areas on her legs as well.     Additional Information   Social History     Tobacco Use     Smoking status: Former Smoker     Last attempt to quit: 1964     Years since quittin.6     Smokeless tobacco: Never Used   Substance Use Topics     Alcohol use: Yes     Alcohol/week: 1.0 standard drinks     Types: 1 Glasses of wine per week     Comment: 1 glass of wine per day     Drug use: No            Vane Blount MD      The patient has been notified of following:     \"This video visit will be conducted via a call between you and your physician/provider. We have found that certain health care needs can be provided without the need for an in-person physical exam.  This service lets us provide the care you need with a video conversation.  If a prescription is necessary we can send it directly to your pharmacy.  If lab work is needed we can place an order for that and you can then stop by our lab to have the test done at a later time.    Video visits are billed at different rates depending on your insurance coverage. Please reach out to your insurance provider with any questions.    If during the course of the call the physician/provider feels a video visit is not appropriate, you will not be charged for this service.\"    Patient has given verbal consent to a Video visit? Yes  How would you like to obtain your AVS? AVS Preference: [facility] [place]  Patient would like the video invitation sent by: Text to cell phone: [phone]  Will anyone else be joining your video visit? No        Video Start Time: 4:22 pm        Video-Visit Details    Type of service:  Video Visit    Video End Time (time video stopped): 4:32 pm  Originating Location (pt. " Location): Home    Distant Location (provider location):  [facility] INTER[facility] MEDIC[facility]     Platform used for Video Visit: Isabel Blount MD

## 2021-06-11 NOTE — PROGRESS NOTES
"Office Visit - Follow up    Mitra Hall   82 y.o. female    Date of Visit: 6/5/2017    Chief Complaint   Patient presents with     Pre-op Exam     Right knee replacement on 6/26 at [facility] by Dr Medina       Subjective: Preoperative evaluation prior to planned right total knee replacement to be performed on June 26 by Dr. Ford Medina.    Persistent problems with disability related to advanced degenerative disease right knee I did answer appropriate questions regarding upcoming surgery.    Past history includes right-sided breast cancer and previous history of adenocarcinoma of the colon.  Has had no evidence of recurrence of either of these malignancies    History of restless leg syndrome controlled with the combination of gabapentin and pramipexole stable    History of irritable bowel syndrome intermittent migraine headaches which have been stable as well    General health otherwise excellent she has no known allergies    Personal history unremarkable she is active physically non-smoker ethanol intake minimal    Family history nonrelevant healthy    Review of systems negative        ROS: A comprehensive review of systems was performed and was otherwise negative except as mentioned above.     Exam  Alert and oriented with normal mental status no cognitive head and neck negative EENT negative lungs clear heart normal abdomen benign left breast negative right breast no masses extremities normal peripheral pulses minor venous stasis changes    Remainder of exam negative neuro normal   /62  Pulse 76  Ht 4' 11\" (1.499 m)  Wt 122 lb (55.3 kg)  LMP 06/20/1987  BMI 24.64 kg/m2    Assessment and Plan  Labs pending    Essentially normal exam discussed surgical plans at length no other changes    Mitra was seen today for pre-op exam.    Diagnoses and all orders for this visit:    Preop examination  -     Electrocardiogram Perform and Read  -     HM2(CBC w/o Differential); Future  -     Basic Metabolic " Panel  -     HM2(CBC w/o Differential)    Malignant neoplasm of female breast  -     HM2(CBC w/o Differential); Future  -     Basic Metabolic Panel  -     HM2(CBC w/o Differential)    Adenocarcinoma Of The Cecum  -     HM2(CBC w/o Differential); Future  -     Basic Metabolic Panel  -     HM2(CBC w/o Differential)    Other irritable bowel syndrome  -     HM2(CBC w/o Differential); Future  -     Basic Metabolic Panel  -     HM2(CBC w/o Differential)    Restless Legs Syndrome  -     HM2(CBC w/o Differential); Future  -     Basic Metabolic Panel  -     HM2(CBC w/o Differential)    Osteoarthritis, knee  -     HM2(CBC w/o Differential); Future  -     Basic Metabolic Panel  -     HM2(CBC w/o Differential)    Migraine  -     HM2(CBC w/o Differential); Future  -     Basic Metabolic Panel  -     HM2(CBC w/o Differential)    Other orders  -     SUMAtriptan (IMITREX) 25 MG tablet; TAKE 1 TABLET BY MOUTH AS NEEDED FOR MIGRAINE  -     irbesartan-hydrochlorothiazide (AVALIDE) 300-12.5 mg per tablet; TAKE 1 TABLET BY MOUTH EVERY DAY          Time: total time spent with the patient was 45 minutes of which >50% was spent in counseling and coordination of care        No Known Allergies    Medications :  Prior to Admission medications    Medication Sig Start Date End Date Taking? Authorizing Provider   acetaminophen (TYLENOL) 650 MG CR tablet Take 650 mg by mouth every 8 (eight) hours as needed for pain.   Yes PROVIDER, HISTORICAL   aspirin 81 MG EC tablet Take 81 mg by mouth daily.   Yes Chang Simmons MD   azelastine (ASTELIN) 137 mcg (0.1 %) nasal spray  6/25/15  Yes PROVIDER, HISTORICAL   butalbital-aspirin-caffeine (FIORINAL) -40 mg per capsule TAKE 1 CAPSULE BY MOUTH THREE TIMES DAILY IF NEEDED 12/9/16  Yes Chang Simmons MD   fluticasone (FLONASE) 50 mcg/actuation nasal spray 2 sprays into each nostril daily. 3/7/17  Yes Chang Simmons MD   gabapentin (NEURONTIN) 800 MG tablet TAKE 1 TABLET BY MOUTH EVERY DAY 3/17/17   Yes Chang Valadez MD   irbesartan-hydrochlorothiazide (AVALIDE) 300-12.5 mg per tablet TAKE 1 TABLET BY MOUTH EVERY DAY 6/5/17  Yes Chang Simomns MD   LORazepam (ATIVAN) 0.5 MG tablet Take 1 tablet (0.5 mg total) by mouth bedtime as needed (sleep). 1/26/16  Yes Chang Simmons MD   naproxen sodium (ALEVE) 220 MG tablet Take 220 mg by mouth 2 (two) times a day.   Yes PROVIDER, HISTORICAL   omeprazole (PRILOSEC) 20 MG capsule Take 20 mg by mouth daily.   Yes PROVIDER, HISTORICAL   pramipexole (MIRAPEX) 0.5 MG tablet TAKE 2 TABLETS BY MOUTH EVERY DAY 2/7/16  Yes Chang Simmons MD   PREMARIN vaginal cream USE AS DIRECTED 10/18/16  Yes Chang Simmons MD   SUMAtriptan (IMITREX) 25 MG tablet TAKE 1 TABLET BY MOUTH AS NEEDED FOR MIGRAINE 6/5/17  Yes Chang Simmons MD   traZODone (DESYREL) 50 MG tablet TAKE 1 TABLET(50 MG) BY MOUTH AT BEDTIME 5/8/17  Yes Chang Simmons MD   irbesartan-hydrochlorothiazide (AVALIDE) 300-12.5 mg per tablet TAKE 1 TABLET BY MOUTH EVERY DAY 6/4/17 6/5/17 Yes Chang Simmons MD   SUMAtriptan (IMITREX) 25 MG tablet TAKE 1 TABLET BY MOUTH AS NEEDED FOR MIGRAINE 6/4/17 6/5/17 Yes Chang Simmons MD   dicyclomine (BENTYL) 20 mg tablet Take 1 tablet (20 mg total) by mouth 3 (three) times a day as needed. 1/3/17 1/3/18  Chang Simmons MD   diphenoxylate-atropine (LOMOTIL) 2.5-0.025 mg per tablet Take 1 tablet by mouth 4 (four) times a day as needed for diarrhea.    PROVIDER, HISTORICAL   traZODone (DESYREL) 50 MG tablet TAKE 1 TABLET(50 MG) BY MOUTH AT BEDTIME 2/8/17 6/5/17  Chang Simmons MD        Past Medical History:   Past Medical History:   Diagnosis Date     Breast cancer 1998    left mastectomy     Cancer     adenocarcinoma of colon [place] B - 30 years ago     Cancer     left breast ca 1998     Cataract      Colon cancer 1983     Osteoporosis     mild     RLS (restless legs syndrome)        Past Surgical History:   Past Surgical History:   Procedure Laterality Date     BREAST BIOPSY  Left 1998     COLECTOMY      [place] [facility] CA     MASTECTOMY Left 1998    Ca     REDUCTION MAMMAPLASTY Right 2000    done after left mastect.       Social History:   Social History     Social History     Marital status:      Spouse name: N/A     Number of children: N/A     Years of education: N/A     Occupational History     Not on file.     Social History Main Topics     Smoking status: Former Smoker     Quit date: 12/16/1964     Smokeless tobacco: Not on file     Alcohol use 0.6 oz/week     1 Glasses of wine per week      Comment: 1 glass of wine/week     Drug use: No     Sexual activity: Not on file     Other Topics Concern     Not on file     Social History Narrative       Family History: No family history on file.      Chang Simmons MD

## 2021-06-14 NOTE — PROGRESS NOTES
"Office Visit - Follow up    Mitra Hall   82 y.o. female    Date of Visit: 12/22/2017    Chief Complaint   Patient presents with     Pre-op Exam     Eyelid surgery on 1/2 at [facility] by Dr Bustillos       Subjective: Here for preoperative evaluation prior to planned bilateral blepharoplasty for bilateral ptosis of both eyelids to be performed by Dr. Bustillos.  General health has been good there is no major health concern.  Chronic concerns have included both breast and colon cancer has had no evidence of recurrence    History of restless leg syndrome currently stable in addition to irritable bowel syndrome stable as well    History of hypertension well controlled    Mild intermittent irritable bowel syndrome stable    Personal social and family history reviewed and noncontributory    Current regimen reviewed discussed and reconciled    Preoperative EKG is normal there is evidence of intermittent PACs.    Preoperative labs are pending    Patient offers no other new concerns    ROS: A comprehensive review of systems was performed and was otherwise negative except as mentioned above.     Exam  She does have bilateral ptosis of the eyelids.  Formal visual fields have been performed    EENT otherwise negative skin and lymphatics normal lungs clear heart normal head and neck unremarkable abdomen benign extremities normal   /80  Pulse 66  Ht 4' 11\" (1.499 m)  Wt 128 lb (58.1 kg)  LMP 06/20/1987  BMI 25.85 kg/m2    Assessment and Plan  Stable preoperative exam we will proceed with preoperative labs    No other major changes or concerns    Mitra was seen today for pre-op exam.    Diagnoses and all orders for this visit:    Adenocarcinoma Of The Cecum  -     HM2(CBC w/o Differential); Future  -     Basic Metabolic Panel    Malignant neoplasm of female breast  -     HM2(CBC w/o Differential); Future  -     Basic Metabolic Panel    Preop examination  -     HM2(CBC w/o Differential); Future  -     Basic Metabolic " Panel    Ptosis of eyelid, bilateral  -     HM2(CBC w/o Differential); Future  -     Basic Metabolic Panel          Time: total time spent with the patient was 30 minutes of which >50% was spent in counseling and coordination of care        No Known Allergies    Medications :  Prior to Admission medications    Medication Sig Start Date End Date Taking? Authorizing Provider   acetaminophen (TYLENOL) 650 MG CR tablet Take 650 mg by mouth every 8 (eight) hours as needed for pain.   Yes PROVIDER, HISTORICAL   aspirin 81 MG EC tablet Take 81 mg by mouth daily.   Yes Chang Simmons MD   azelastine (ASTELIN) 137 mcg (0.1 %) nasal spray  6/25/15  Yes PROVIDER, HISTORICAL   butalbital-aspirin-caffeine (FIORINAL) -40 mg per capsule TAKE 1 CAPSULE BY MOUTH THREE TIMES DAILY IF NEEDED 12/9/16  Yes Chang Simmons MD   conjugated estrogens (PREMARIN) vaginal cream USE AS DIRECTED 11/14/17  Yes Chang Simmons MD   dicyclomine (BENTYL) 20 mg tablet Take 1 tablet (20 mg total) by mouth 3 (three) times a day as needed. 1/3/17 1/3/18 Yes Chang Simmons MD   diphenoxylate-atropine (LOMOTIL) 2.5-0.025 mg per tablet Take 1 tablet by mouth 4 (four) times a day as needed for diarrhea.   Yes PROVIDER, HISTORICAL   fluticasone (FLONASE) 50 mcg/actuation nasal spray 2 sprays into each nostril daily. 3/7/17  Yes Chang Simmons MD   gabapentin (NEURONTIN) 800 MG tablet TAKE 1 TABLET BY MOUTH EVERY DAY 12/21/17  Yes Chang Simmons MD   irbesartan-hydrochlorothiazide (AVALIDE) 300-12.5 mg per tablet TAKE 1 TABLET BY MOUTH EVERY DAY 6/5/17  Yes Chang Simmons MD   LORazepam (ATIVAN) 0.5 MG tablet Take 1 tablet (0.5 mg total) by mouth bedtime as needed (sleep). 1/26/16  Yes Chang Simmons MD   naproxen sodium (ALEVE) 220 MG tablet Take 220 mg by mouth 2 (two) times a day.   Yes PROVIDER, HISTORICAL   omeprazole (PRILOSEC) 20 MG capsule Take 20 mg by mouth daily.   Yes PROVIDER, HISTORICAL   pramipexole (MIRAPEX) 0.5 MG tablet Take 2  tablets (1 mg total) by mouth daily. 11/14/17  Yes Chang Simmons MD   SUMAtriptan (IMITREX) 25 MG tablet TAKE 1 TABLET BY MOUTH AS NEEDED FOR MIGRAINE 6/5/17  Yes Chang Simmons MD   traZODone (DESYREL) 50 MG tablet TAKE 1 TABLET(50 MG) BY MOUTH AT BEDTIME 11/14/17  Yes Chang Simmons MD   gabapentin (NEURONTIN) 800 MG tablet TAKE 1 TABLET BY MOUTH EVERY DAY 3/17/17 12/22/17 Yes Chang Valadez MD   gabapentin (NEURONTIN) 800 MG tablet TAKE 1 TABLET BY MOUTH EVERY DAY 10/5/17 12/22/17 Yes Chang Valadez MD   amoxicillin (AMOXIL) 500 MG capsule Take 4 capsules 1 hour before procedure 11/13/17   Chang Simmons MD        Past Medical History:   Past Medical History:   Diagnosis Date     Breast cancer 1998    left mastectomy     Breast injury     10/2017 fell flat of chest while walking     Cancer     adenocarcinoma of colon [place] [facility] - 30 years ago     Cancer     left breast ca 1998     Cataract      Colon cancer 1983     Osteoporosis     mild     RLS (restless legs syndrome)        Past Surgical History:   Past Surgical History:   Procedure Laterality Date     BREAST BIOPSY Left 1998     COLECTOMY      [place] CA     MASTECTOMY Left 1998    Ca     REDUCTION MAMMAPLASTY Right 2000    done after left mastect.       Social History:   Social History     Social History     Marital status:      Spouse name: N/A     Number of children: N/A     Years of education: N/A     Occupational History     Not on file.     Social History Main Topics     Smoking status: Former Smoker     Quit date: 12/16/1964     Smokeless tobacco: Never Used     Alcohol use 0.6 oz/week     1 Glasses of wine per week      Comment: 1 glass of wine/week     Drug use: No     Sexual activity: Not on file     Other Topics Concern     Not on file     Social History Narrative       Family History:   Family History   Problem Relation Age of Onset     Breast cancer Neg Hx          Chang Simmons MD

## 2021-06-15 NOTE — PROGRESS NOTES
Subjective:      Patient ID: Mitra Hall is a 82 y.o. female.    Chief Complaint:    HPI Mitra Hall is a 82 y.o. female who presents today complaining of dizziness x 7 months. She states that she has a history of positional vertigo which has been worsening since 6/2018. Her symptoms feel worse when she bends over and then brings her head back up. She has not noticed it when she is going from a seated position to a standing position. Patient was worked up for similar sxs before with a CT and carotid US.  That workup was approximately 1 year ago and was normal. She was previously treated with Meclizine, she does not believe that it helped. She believes he may have done a procedure in the office that helped with the symptoms. She states that she is able to walk without any issues. She states that her dizziness is worse when she tilts her head to the left. She has not tried anything at home to alleviate her sxs. She has not noticed the sensation come on without changing positions.  She denies any sick symptoms such as fever, congestion, sore throat, cough.  She denies any chest pain, shortness of breath, or leg swelling.  She denies any abdominal complaints such as nausea, vomiting, diarrhea, or abdominal pain.  She denies any behavioral or neurologic changes such as confusion, change in speech, or behavioral changes.  She describes that this dizziness is more of a spinning sensation rather than a lightheadedness.  She denies any visual disturbances or headaches.  She does have a left cataract that is scheduled to be operated on.  She reports that because she has this cataract she has some sensitivity to light.  She denies any hearing symptoms such as tinnitus or hearing loss.      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       Family History   Problem Relation Age of Onset     Breast cancer Neg Hx        Social History   Substance Use Topics     Smoking status: Former Smoker     Quit date: 12/16/1964     Smokeless tobacco: Never Used     Alcohol use 0.6 oz/week     1 Glasses of wine per week      Comment: 1 glass of wine/week       Review of Systems   Constitutional: Negative for fever.   HENT: Negative for congestion, hearing loss, rhinorrhea, sore throat and tinnitus.    Eyes: Positive for photophobia. Negative for visual disturbance.   Respiratory: Negative for cough and shortness of breath.    Cardiovascular: Negative for chest pain and leg swelling.   Gastrointestinal: Negative for abdominal pain, diarrhea, nausea and vomiting.   Musculoskeletal: Negative for gait problem.   Neurological: Positive for dizziness. Negative for syncope, speech difficulty, weakness, light-headedness and headaches.   Psychiatric/Behavioral: Negative for behavioral problems, confusion and decreased concentration.   All other systems reviewed and are negative.      Objective:     /82  Pulse 67  Temp 98.3  F (36.8  C) (Oral)   Wt 129 lb (58.5 kg)  LMP 06/20/1987  SpO2 98%  Breastfeeding? No  BMI 26.05 kg/m2    Physical Exam   Constitutional: She is oriented to person, place, and time. She appears well-developed and well-nourished. No distress.   HENT:   Head: Normocephalic and atraumatic.   Right Ear: External ear normal.   Left Ear: External ear normal.   Left ear appeared normal.  Right ear was occluded with cerumen.  After irrigation it was clear and irritated.   Eyes: Conjunctivae and EOM are normal. Right eye exhibits  no discharge. Left eye exhibits no discharge. Right conjunctiva is not injected. Left conjunctiva is not injected. Right eye exhibits normal extraocular motion and no nystagmus. Left eye exhibits normal extraocular motion and no nystagmus. Pupils are unequal.   Left pupil appeared sluggish in reactivity.  The cataract was visualized from the lateral view.  Right eye exam was normal.   Cardiovascular: Normal rate and normal heart sounds.  An irregular rhythm present. Exam reveals no gallop and no friction rub.    No murmur heard.  Patient's heart rate was irregular.  I did recommend that have an EKG to rule out a serious arrhythmia.  Patient declined having the EKG done, she reports a history of premature beats.   Pulmonary/Chest: Effort normal and breath sounds normal. No respiratory distress. She has no wheezes. She has no rales.   Neurological: She is alert and oriented to person, place, and time. She has normal strength. No cranial nerve deficit or sensory deficit. Coordination and gait normal. GCS eye subscore is 4. GCS verbal subscore is 5. GCS motor subscore is 6.   Skin: She is not diaphoretic.   Psychiatric: She has a normal mood and affect. Her behavior is normal. Judgment and thought content normal.   Nursing note and vitals reviewed.    Clinical Decision Making:  Considering patient had abnormal heart rhythm on physical exam I recommended EKG, however patient refused.  I also stated that the patient had a sluggish left pupil, patient did not believe me considering she recently had an exam completed by her ophthalmologist about 20 days ago.  I recommended getting orthostatic blood pressures, but patient refused.  After multiple refusal I asked the patient what she wanted out of the visit.  She wanted the Apley's maneuver, but I stated that that would be a primary care visit.  Patient was upset with the fact that I would not be doing an Epley maneuver today.  Did offer meclizine which she agreed to try.  I  did explain the patient that without ruling out cardiac or central causes of her vertigo symptoms I could not for certainty diagnose her with a BPPV.  Patient understood the risks of not rolling these etiologies out, and preferred to follow-up with her primary care provider instead.  She states that she is going on vacation leaving tomorrow and she will schedule appointment after she returns.    Assessment:     Procedures    1. Cerumen impaction  Nursing communication   2. Vertigo  meclizine (ANTIVERT) 25 mg tablet         Patient Instructions   1. Follow up with your primary care provider for further evaluation of your dizziness.   2. Take Meclizine for treatment of your vertigo symptoms. It possesses anticholinergic, central nervous system depressant, and local anesthetic effects. Its antiemetic and antivertigo effects are not fully understood, but its central anticholinergic properties are partially responsible. The drug depresses labyrinth excitability and vestibular stimulation, and it may affect the medullary chemoreceptor trigger zone.  3. Seek emergency medical attention if you develop weakness, confusion, change in speech, lapse in memory, loss of consciousness, or change in vision.

## 2021-06-15 NOTE — PROGRESS NOTES
Assessment / Impression     1. Vaginal discharge  Wet Prep, genital   2. BV (bacterial vaginosis)  metroNIDAZOLE (METROGEL VAGINAL) 0.75 % vaginal gel       Plan:     1. Discussed results of wet prep. Offered treatment options. She would like to try vaginal abx. Rx for metrogel PV QHS for 5 nights provided. RTC if sxs fail to improve or worsen.     Subjective:      HPI: Mitra Hall is a 82 y.o. female with  Complaints of vaginal discharge is here for evaluation. This is a very pleasant 82 year old female.  postmenopausal for 30+ years who is here with a one month history of vaginal discharge. Patient reports she has not had vaginal discharge concerns since she has been in menopause. She states one month ago she had blepharoplasty and used topical bacitracin. No oral abx. No significant stressors. She states she tried using her premarin cream to see if that would help her sxs and it didn't. She had 10 years of oral HRTin early menopause and has used vaginal premarin once a month for 15+years for vaginal dryness. She denies a change in this regimen. She is not sexually active and hasn't been for many years.She reports that there was some odor to the discharge at the onset a month ago, but that has stopped. She also reports the amount of discharge is much less since it started. She was given an oral diflucan rx 2 weeks ago to treat a possible yeast infection. She took both pills one week apart and doesn't recall if it changed the sxs. She has no pain or bleeding. She occasionally has episode of stress incontinence, but rare and no change recently. No other change to PMHX or medication. Denies new chemical exposure or douching.   Non smoker (quit ago 42). No history of abnormal pap smears. No gyn surgeries other than tubal ligation.        Review of Systems  Pertinent items are noted in HPI.       Objective:     /76 (Patient Site: Right Arm, Patient Position: Sitting, Cuff Size: Adult Regular)  Pulse 62  " Ht 4' 11\" (1.499 m)  Wt 128 lb (58.1 kg)  LMP 06/20/1987  SpO2 98%  BMI 25.85 kg/m2  Physical Examination: General appearance - alert, well appearing, and in no distress  :  External genitalia reveals narrowing of the vaginal introitus and atophic changes, but no lesions seen.  Speculum exam reveals a normal cervix with no abnormal discharge seen from cervical os.  Atrophic vaginal mucosa without redness or lesions. Scant clear discharge noted, but wet prep obtained.  Bimanual exam reveals a freely mobile uterus in the midline without any adnexal masses or tenderness.    Wet prep: + clue cells  "

## 2021-06-15 NOTE — PROGRESS NOTES
"Office Visit - Follow up    Mitra Hall   82 y.o. female    Date of Visit: 2/6/2018    Chief Complaint   Patient presents with     Dizziness       Subjective: Pleasant patient with history of hypertension restless leg syndrome osteoporosis irritable bowel syndrome and insomnia as well as previous resection of both breast cancer and adenocarcinoma of the colon without recurrence..  Chief concern today is 1 of dizziness.  Was seen in the walk-in clinic recently and I reviewed these notes.  Remote history of problems with dizziness as well.    She was told in the past that she has cervical stenosis and this was contributing to her symptoms of dizziness.  I do not have these previous records although she does have intermittent problems with neck and back pain as well as mild radiculopathy of the left arm.    No recent head trauma    No symptoms of postural hypotension although does get a bit dizzy or lightheaded when she gets up out of bed if she does not do so slowly    Otherwise is relatively well and has no other complaints        ROS: A comprehensive review of systems was performed and was otherwise negative except as mentioned above.     Exam  Head and neck negative no postural drop heart and lungs negative   /80  Pulse 66  Ht 4' 11\" (1.499 m)  Wt 127 lb (57.6 kg)  LMP 06/20/1987  BMI 25.65 kg/m2    Assessment and Plan  Intermittent dizziness of uncertain etiology likely multifactorial.  She does think the meclizine as represcribed in the walk-in clinic because of some help.  I did indicate that it would be unlikely that cervical stenosis was the sole cause of her dizziness and certainly would not be a reason to proceed with further evaluation or surgery at this point.  I did attempt to find old records regarding this however these were not available indicates she did have previous MRI of her cervical spine.  I did reassure her he will continue with meclizine as needed continue to monitor blood " pressures and be cautious particularly with her gait if she is having symptoms of dizziness    Mitra was seen today for dizziness.    Diagnoses and all orders for this visit:    Vaginal dryness  -     conjugated estrogens (PREMARIN) vaginal cream; USE AS DIRECTED    Cervical arthritis    Cervical stenosis of spine    Dizziness    Other orders  -     diphenoxylate-atropine (LOMOTIL) 2.5-0.025 mg per tablet; Take 1 tablet by mouth 4 (four) times a day as needed for diarrhea.          Time: total time spent with the patient was 25 minutes of which >50% was spent in counseling and coordination of care        Allergies   Allergen Reactions     Hydrocodone-Acetaminophen Nausea And Vomiting       Medications :  Prior to Admission medications    Medication Sig Start Date End Date Taking? Authorizing Provider   acetaminophen (TYLENOL) 650 MG CR tablet Take 650 mg by mouth every 8 (eight) hours as needed for pain.   Yes PROVIDER, HISTORICAL   amoxicillin (AMOXIL) 500 MG capsule Take 4 capsules 1 hour before procedure 11/13/17  Yes Chang Simmons MD   aspirin 81 MG EC tablet Take 81 mg by mouth daily.   Yes Chang Simmons MD   azelastine (ASTELIN) 137 mcg (0.1 %) nasal spray  6/25/15  Yes PROVIDER, HISTORICAL   butalbital-aspirin-caffeine (FIORINAL) -40 mg per capsule TAKE 1 CAPSULE BY MOUTH THREE TIMES DAILY IF NEEDED 12/9/16  Yes Chang Simmons MD   conjugated estrogens (PREMARIN) vaginal cream USE AS DIRECTED 2/6/18  Yes Chang Simmons MD   diphenoxylate-atropine (LOMOTIL) 2.5-0.025 mg per tablet Take 1 tablet by mouth 4 (four) times a day as needed for diarrhea. 2/6/18  Yes Chang Simmons MD   fluticasone (FLONASE) 50 mcg/actuation nasal spray 2 sprays into each nostril daily. 3/7/17  Yes Chang Simmons MD   gabapentin (NEURONTIN) 800 MG tablet TAKE 1 TABLET BY MOUTH EVERY DAY 12/21/17  Yes Chang Simmons MD   irbesartan-hydrochlorothiazide (AVALIDE) 300-12.5 mg per tablet TAKE 1 TABLET BY MOUTH EVERY DAY  6/5/17  Yes Chang Simmons MD   LORazepam (ATIVAN) 0.5 MG tablet Take 1 tablet (0.5 mg total) by mouth bedtime as needed (sleep). 1/26/16  Yes Chang Simmons MD   meclizine (ANTIVERT) 25 mg tablet Take 1 tablet (25 mg total) by mouth 3 (three) times a day as needed for dizziness or nausea. 1/29/18  Yes Juany Larson PA-C   naproxen sodium (ALEVE) 220 MG tablet Take 220 mg by mouth 2 (two) times a day.   Yes PROVIDER, HISTORICAL   omeprazole (PRILOSEC) 20 MG capsule Take 20 mg by mouth daily.   Yes PROVIDER, HISTORICAL   pramipexole (MIRAPEX) 0.5 MG tablet TAKE 2 TABLETS(1 MG) BY MOUTH DAILY 1/8/18  Yes Chang Simmons MD   SUMAtriptan (IMITREX) 25 MG tablet TAKE 1 TABLET BY MOUTH AS NEEDED FOR MIGRAINE 6/5/17  Yes Chang Simmons MD   traZODone (DESYREL) 50 MG tablet TAKE 1 TABLET(50 MG) BY MOUTH AT BEDTIME 1/8/18  Yes Chang Simmons MD   conjugated estrogens (PREMARIN) vaginal cream USE AS DIRECTED 11/14/17 2/6/18 Yes Chang Simmons MD   diphenoxylate-atropine (LOMOTIL) 2.5-0.025 mg per tablet Take 1 tablet by mouth 4 (four) times a day as needed for diarrhea.  2/6/18 Yes PROVIDER, HISTORICAL        Past Medical History:   Past Medical History:   Diagnosis Date     Breast cancer 1998    left mastectomy     Breast injury     10/2017 fell flat of chest while walking     Cancer     adenocarcinoma of colon [facility] [facility] - 30 years ago     Cancer     left breast ca 1998     Cataract      Colon cancer 1983     Osteoporosis     mild     RLS (restless legs syndrome)        Past Surgical History:   Past Surgical History:   Procedure Laterality Date     BREAST BIOPSY Left 1998     COLECTOMY      [place] [facility] CA     MASTECTOMY Left 1998    Ca     REDUCTION MAMMAPLASTY Right 2000    done after left mastect.       Social History:   Social History     Social History     Marital status:      Spouse name: N/A     Number of children: N/A     Years of education: N/A     Occupational History     Not on file.     Social History Main  Topics     Smoking status: Former Smoker     Quit date: 12/16/1964     Smokeless tobacco: Never Used     Alcohol use 0.6 oz/week     1 Glasses of wine per week      Comment: 1 glass of wine/week     Drug use: No     Sexual activity: Not on file     Other Topics Concern     Not on file     Social History Narrative       Family History:   Family History   Problem Relation Age of Onset     Breast cancer Neg Hx          Chang Simmons MD

## 2021-06-17 NOTE — PATIENT INSTRUCTIONS - HE
Patient Instructions by Vivi Currie CNP at 1/2/2019  3:20 PM     Author: Vivi Currie CNP Service: -- Author Type: Nurse Practitioner    Filed: 1/2/2019  4:31 PM Encounter Date: 1/2/2019 Status: Addendum    : Vivi Currie CNP (Nurse Practitioner)    Related Notes: Original Note by Vivi Currie CNP (Nurse Practitioner) filed at 1/2/2019  4:30 PM       The swab we did today was normal, no bacterial vaginosis nor yeast seen.  Sometimes increased vaginal discharge is as result of low estrogen level secondary to menopause.    I would discuss this issue with your women's health provider.  Patient Education     Atrophic Vaginitis    Atrophic vaginitis means the walls of your vagina have become thin. This happens when your body makes too little of the hormone estrogen. Menopause or surgical removal of the ovaries are the most common causes for a drop in estrogen. Breastfeeding can also cause the hormone level to drop.  Symptoms of atrophic vaginitis include:    Dryness, soreness, burning, or itching in the vagina    Vaginal discharge  Sex can be uncomfortable, even painful. After sex, you may have bleeding from your vagina. You may also have burning or pain when you urinate.  Home care  Your healthcare provider may recommend 1 or more of these as treatment:    Vaginal creams, lotions, and lubricants. These products help relieve vaginal dryness. They dont need a prescription. They can be found in the personal care section of most pharmacies. Creams and lotions are used daily to help keep the vagina moist. Lubricants help reduce dryness and pain during sex. Choose water-based lubricants. Dont use petroleum jelly, mineral oil, or other oils. These increase the chance of infection.     Hormone therapy (HT). HT increases the amount of estrogen in your body. This can help manage or relieve symptoms. HT can be given in pill form. It may be given as a lotion, cream, ring put into the vagina, or a patch on  the skin. The risks and benefits of HT vary for each woman. For instance, your risk may be higher if you have had breast cancer. Discuss this treatment with your healthcare provider. Not every woman can use HT.  You dont need to give up (abstain from) sex. In fact, regular sex can help keep vaginal tissues healthy. Take steps to make sex more comfortable by using water-based lubricants.  Preventing infections  Atrophic vaginitis makes an infection of the vagina or the urinary tract more likely. To help reduce your risk:    Keep your genitals clean. When you bathe, wash the outside of your vagina with mild soap and water. Clean gently between the folds of your vagina.    Wipe from front to back after a bowel movement.    Dont douche unless your healthcare provider tells you to.    Avoid scented toilet paper, scented vaginal sprays, and scented tampons.    Avoid wearing clothes that are tight in the crotch. These include pantyhose, jeans, and leggings. Wear cotton underwear. Change it every day.  Follow-up care  Follow up with your healthcare provider, or as advised.  When to seek medical advice  Call your health care provider right away if any of these occur:    Fever of 100.4 F (38 C) or higher, or as directed by your healthcare provider    Symptoms dont go away or get worse even with treatment    Vaginal area swells or becomes painful    Vaginal area bleeds, but not because of your period    Bad-smelling discharge from the vagina    Pain or burning when you urinate or you have trouble passing urine    Open sores develop around vagina   Date Last Reviewed: 12/1/2016 2000-2017 [email or web address]. [address] [place] [address], [place] [address]. All rights reserved. This information is not intended as a substitute for professional medical care. Always follow your healthcare professional's instructions.

## 2021-06-20 NOTE — PROGRESS NOTES
"Office Visit - Follow up    Mitra Hall   83 y.o. female    Date of Visit: 8/23/2018    Chief Complaint   Patient presents with     Pre-op Exam     Left eye cataract surgery on 8/28 at [facility] by Dr Bustillos       Subjective: Routine physical exam and preoperative exam prior to planned cataract surgery on August 28 performed by Dr. Bustillos see his notes regarding surgical plans    History includes previous breast cancer which has been resected and is stable without recurrence also history of colon cancer resected without evidence of recurrence.  Medical history includes cervical spine stenosis in addition to chronic insomnia restless leg syndrome and osteoarthritis    History of mild irritable bowel syndrome on Lomotil    General health has been good she offers no new specific complaints    Personal social history none relevant she is very active physically        ROS: A comprehensive review of systems was performed and was otherwise negative except as mentioned above.     Exam  Alert and oriented with normal mental status head and neck negative EENT unremarkable see notes by eye doctor for eye exam    Head and neck negative no bruits no JVD lungs clear heart negative abdomen benign extremities unremarkable peripheral pulses normal breast exam negative   /70  Pulse 62  Ht 4' 11\" (1.499 m)  Wt 127 lb (57.6 kg)  LMP 06/20/1987  BMI 25.65 kg/m2    Assessment and Plan  Normal preoperative exam labs pending at this time no contraindication to planned surgery    Mitra was seen today for pre-op exam.    Diagnoses and all orders for this visit:    Routine general medical examination at a health care facility  -     HM2(CBC w/o Differential); Future  -     Comprehensive Metabolic Panel; Future  -     Lipid Cascade; Future  -     HM2(CBC w/o Differential)  -     Comprehensive Metabolic Panel  -     Lipid Cascade    Preoperative examination  -     HM2(CBC w/o Differential); Future  -     Comprehensive " Metabolic Panel; Future  -     Lipid Cascade; Future  -     HM2(CBC w/o Differential)  -     Comprehensive Metabolic Panel  -     Lipid Cascade    Malignant neoplasm of female breast (H)  -     HM2(CBC w/o Differential); Future  -     Comprehensive Metabolic Panel; Future  -     Lipid Cascade; Future  -     HM2(CBC w/o Differential)  -     Comprehensive Metabolic Panel  -     Lipid Cascade    Cervical stenosis of spine  -     HM2(CBC w/o Differential); Future  -     Comprehensive Metabolic Panel; Future  -     Lipid Cascade; Future  -     HM2(CBC w/o Differential)  -     Comprehensive Metabolic Panel  -     Lipid Cascade    Adenocarcinoma Of The Cecum  -     HM2(CBC w/o Differential); Future  -     Comprehensive Metabolic Panel; Future  -     Lipid Cascade; Future  -     HM2(CBC w/o Differential)  -     Comprehensive Metabolic Panel  -     Lipid Cascade    Insomnia  -     HM2(CBC w/o Differential); Future  -     Comprehensive Metabolic Panel; Future  -     Lipid Cascade; Future  -     HM2(CBC w/o Differential)  -     Comprehensive Metabolic Panel  -     Lipid Cascade    Restless Legs Syndrome  -     HM2(CBC w/o Differential); Future  -     Comprehensive Metabolic Panel; Future  -     Lipid Cascade; Future  -     HM2(CBC w/o Differential)  -     Comprehensive Metabolic Panel  -     Lipid [place]    Osteoarthritis, knee  -     HM2(CBC w/o Differential); Future  -     Comprehensive Metabolic Panel; Future  -     Lipid Cascade; Future  -     HM2(CBC w/o Differential)  -     Comprehensive Metabolic Panel  -     Lipid Cascade    Other orders  -     amoxicillin (AMOXIL) 500 MG capsule; Take 4 capsules 1 hour before procedure          Time: total time spent with the patient was 30 minutes of which >50% was spent in counseling and coordination of care        Allergies   Allergen Reactions     Hydrocodone-Acetaminophen Nausea And Vomiting       Medications :  Prior to Admission medications    Medication Sig Start Date End  Date Taking? Authorizing Provider   acetaminophen (TYLENOL) 650 MG CR tablet Take 650 mg by mouth every 8 (eight) hours as needed for pain.   Yes PROVIDER, HISTORICAL   amoxicillin (AMOXIL) 500 MG capsule Take 4 capsules 1 hour before procedure 8/23/18  Yes Chang Simmons MD   aspirin 81 MG EC tablet Take 81 mg by mouth daily.   Yes Chang Simmons MD   azelastine (ASTELIN) 137 mcg (0.1 %) nasal spray  6/25/15  Yes PROVIDER, HISTORICAL   butalbital-aspirin-caffeine (FIORINAL) -40 mg per capsule TAKE 1 CAPSULE BY MOUTH THREE TIMES DAILY IF NEEDED 12/9/16  Yes Chang Simmons MD   conjugated estrogens (PREMARIN) vaginal cream USE AS DIRECTED 2/6/18  Yes Chang Simmons MD   diphenoxylate-atropine (LOMOTIL) 2.5-0.025 mg per tablet Take 1 tablet by mouth 4 (four) times a day as needed for diarrhea. 2/6/18  Yes Chang Simmons MD   fluticasone (FLONASE) 50 mcg/actuation nasal spray 2 sprays into each nostril daily. 3/7/17  Yes Chagn Simmons MD   gabapentin (NEURONTIN) 800 MG tablet TAKE 1 TABLET BY MOUTH EVERY DAY 3/14/18  Yes Chang Simmons MD   irbesartan-hydrochlorothiazide (AVALIDE) 300-12.5 mg per tablet TAKE 1 TABLET BY MOUTH EVERY DAY 6/18/18  Yes Chang Simmons MD   lansoprazole (PREVACID 24HR ORAL) Take by mouth daily.   Yes PROVIDER, HISTORICAL   LORazepam (ATIVAN) 0.5 MG tablet Take 1 tablet (0.5 mg total) by mouth bedtime as needed (sleep). 1/26/16  Yes Chang Simmons MD   naproxen sodium (ALEVE) 220 MG tablet Take 220 mg by mouth 2 (two) times a day.   Yes PROVIDER, HISTORICAL   pramipexole (MIRAPEX) 0.5 MG tablet TAKE 2 TABLETS(1 MG) BY MOUTH DAILY  Patient taking differently: TAKE 1 TABLET (1 MG) BY MOUTH DAILY 7/24/18  Yes Chang Simmons MD   SUMAtriptan (IMITREX) 25 MG tablet TAKE 1 TABLET BY MOUTH AS NEEDED FOR MIGRAINE 6/5/17  Yes Chang Simmons MD   traZODone (DESYREL) 50 MG tablet TAKE 1 TABLET(50 MG) BY MOUTH AT BEDTIME 1/8/18  Yes Chang Simmons MD   amoxicillin (AMOXIL) 500 MG  capsule Take 4 capsules 1 hour before procedure 11/13/17 8/23/18 Yes Chang Simmons MD   hydrocortisone 2.5 % cream Apply to affected areas three times per day as needed 7/5/18 8/23/18  Nelson Wei MD   meclizine (ANTIVERT) 25 mg tablet Take 1 tablet (25 mg total) by mouth 3 (three) times a day as needed for dizziness or nausea. 1/29/18 8/23/18  Juany Larson PA-C   omeprazole (PRILOSEC) 20 MG capsule Take 20 mg by mouth daily.  8/23/18  PROVIDER, HISTORICAL   pramipexole (MIRAPEX) 0.5 MG tablet TAKE 2 TABLETS(1 MG) BY MOUTH DAILY 1/8/18 8/23/18  Chang Simmons MD        Past Medical History:   Past Medical History:   Diagnosis Date     Breast cancer (H) 1998    left mastectomy     Breast injury     10/2017 fell flat of chest while walking     Cancer (H)     adenocarcinoma of colon [facility] - 30 years ago     Cancer (H)     left breast ca 1998     Cataract      Colon cancer (H) 1983     Osteoporosis     mild     RLS (restless legs syndrome)        Past Surgical History:   Past Surgical History:   Procedure Laterality Date     BREAST BIOPSY Left 1998     COLECTOMY      [place] [facility] CA     MASTECTOMY Left 1998    Ca     REDUCTION MAMMAPLASTY Right 2000    done after left mastect.       Social History:   Social History     Social History     Marital status:      Spouse name: N/A     Number of children: N/A     Years of education: N/A     Occupational History     Not on file.     Social History Main Topics     Smoking status: Former Smoker     Quit date: 12/16/1964     Smokeless tobacco: Never Used     Alcohol use 0.6 oz/week     1 Glasses of wine per week      Comment: 1 glass of wine/week     Drug use: No     Sexual activity: Not on file     Other Topics Concern     Not on file     Social History Narrative       Family History:   Family History   Problem Relation Age of Onset     Breast cancer Neg Hx          Chang Simmons MD

## 2021-06-22 NOTE — PROGRESS NOTES
"Office Visit - Follow up    Mitra Hall   83 y.o. female    Date of Visit: 12/3/2018    Chief Complaint   Patient presents with     Pre-op Exam     Right trigger finger on 12/5 at [facility] by Dr Zavala       Subjective: Pleasant 83-year-old patient here for preoperative evaluation prior to right trigger finger release to be performed by Dr. Zavala.  General health has been stable.  Recent evaluation included a preoperative exam prior to cataract surgery these notes were reviewed and there is no significant change.    History of previous breast cancer resected in stable without recurrence history of previous adenocarcinoma the cecum resected without evidence of recurrence.  Additional medical history includes irritable bowel syndrome cervical arthritis history of concussion intermittent dizziness insomnia and osteopenia as well as restless leg syndrome    Current medications reviewed discussed and reconciled no changes    Personal and social history reviewed no changes    Comprehensive review of systems is negative for new concerns        ROS: A comprehensive review of systems was performed and was otherwise negative except as mentioned above.     Exam  Alert and oriented vital signs stable EENT negative skin and lymphatics negative lungs clear heart normal abdomen benign extremities unremarkable peripheral pulses normal    See notes by Dr. zavala regarding exam       /62 (Patient Site: Right Arm, Patient Position: Sitting)   Pulse 91   Ht 4' 11\" (1.499 m)   Wt 125 lb (56.7 kg)   LMP 06/20/1987   BMI 25.25 kg/m      Assessment and Plan  Stable preoperative exam labs pending no changes    Mitra was seen today for pre-op exam.    Diagnoses and all orders for this visit:    Preop general physical exam  -     Electrocardiogram Perform and Read  -     Hemoglobin  -     Basic Metabolic Panel    Adenocarcinoma Of The Cecum  -     Hemoglobin  -     Basic Metabolic Panel    Malignant neoplasm " of upper-inner quadrant of left female breast, unspecified estrogen receptor status (H)  -     Hemoglobin  -     Basic Metabolic Panel    Cortical age-related cataract of both eyes  -     Hemoglobin  -     Basic Metabolic Panel          Time: total time spent with the patient was 25 minutes of which >50% was spent in counseling and coordination of care        Allergies   Allergen Reactions     Hydrocodone-Acetaminophen Nausea And Vomiting       Medications :  Prior to Admission medications    Medication Sig Start Date End Date Taking? Authorizing Provider   acetaminophen (TYLENOL) 650 MG CR tablet Take 650 mg by mouth every 8 (eight) hours as needed for pain.   Yes PROVIDER, HISTORICAL   aspirin 81 MG EC tablet Take 81 mg by mouth daily.   Yes Chang Simmons MD   azelastine (ASTELIN) 137 mcg (0.1 %) nasal spray  6/25/15  Yes PROVIDER, HISTORICAL   conjugated estrogens (PREMARIN) vaginal cream Insert 1 gram applicator daily. 11/30/18  Yes Chang Simmons MD   diphenoxylate-atropine (LOMOTIL) 2.5-0.025 mg per tablet Take 1 tablet by mouth 4 (four) times a day as needed for diarrhea. 2/6/18  Yes Chang Simmons MD   fluticasone (FLONASE) 50 mcg/actuation nasal spray 2 sprays into each nostril daily. 3/7/17  Yes Chang Simmons MD   gabapentin (NEURONTIN) 800 MG tablet TAKE 1 TABLET BY MOUTH EVERY DAY 3/14/18  Yes Chang Simmons MD   irbesartan-hydrochlorothiazide (AVALIDE) 300-12.5 mg per tablet TAKE 1 TABLET BY MOUTH EVERY DAY 6/18/18  Yes Chang Simmons MD   lansoprazole (PREVACID 24HR ORAL) Take by mouth daily.   Yes PROVIDER, HISTORICAL   LORazepam (ATIVAN) 0.5 MG tablet Take 1 tablet (0.5 mg total) by mouth bedtime as needed (sleep). 1/26/16  Yes Chang Simmons MD   naproxen sodium (ALEVE) 220 MG tablet Take 220 mg by mouth 2 (two) times a day.   Yes PROVIDER, HISTORICAL   pramipexole (MIRAPEX) 0.5 MG tablet TAKE 2 TABLETS(1 MG) BY MOUTH DAILY  Patient taking differently: TAKE 1 TABLET (1 MG) BY MOUTH DAILY  18  Yes Chang Simmons MD   SUMAtriptan (IMITREX) 25 MG tablet TAKE 1 TABLET BY MOUTH AS NEEDED FOR MIGRAINE 17  Yes Chang Simmons MD   traZODone (DESYREL) 50 MG tablet TAKE 1 TABLET(50 MG) BY MOUTH AT BEDTIME 18  Yes Chang Simmons MD   amoxicillin (AMOXIL) 500 MG capsule Take 4 capsules 1 hour before procedure 18   Chang Simmons MD   butalbital-aspirin-caffeine (FIORINAL) -40 mg per capsule TAKE 1 CAPSULE BY MOUTH THREE TIMES DAILY IF NEEDED 16   Chang Simmons MD        Past Medical History:   Past Medical History:   Diagnosis Date     Breast cancer (H)     left mastectomy     Breast injury     10/2017 fell flat of chest while walking     Cancer (H)     adenocarcinoma of colon [place] [facility] - 30 years ago     Cancer (H)     left breast ca      Cataract      Colon cancer (H)      Osteoporosis     mild     RLS (restless legs syndrome)        Past Surgical History:   Past Surgical History:   Procedure Laterality Date     BREAST BIOPSY Left      COLECTOMY      [place] [facility] CA     MASTECTOMY Left     Ca     REDUCTION MAMMAPLASTY Right     done after left mastect.       Social History:   Social History     Socioeconomic History     Marital status:      Spouse name: Not on file     Number of children: Not on file     Years of education: Not on file     Highest education level: Not on file   Social Needs     Financial resource strain: Not on file     Food insecurity - worry: Not on file     Food insecurity - inability: Not on file     Transportation needs - medical: Not on file     Transportation needs - non-medical: Not on file   Occupational History     Not on file   Tobacco Use     Smoking status: Former Smoker     Last attempt to quit: 1964     Years since quittin.0     Smokeless tobacco: Never Used   Substance and Sexual Activity     Alcohol use: Yes     Alcohol/week: 0.6 oz     Types: 1 Glasses of wine per week     Comment: 1 glass of wine/week      Drug use: No     Sexual activity: Not on file   Other Topics Concern     Not on file   Social History Narrative     Not on file       Family History:   Family History   Problem Relation Age of Onset     Breast cancer Neg Hx          Chang Simmons MD

## 2021-06-23 NOTE — TELEPHONE ENCOUNTER
Controlled Substance Refill Request  Medication:   Requested Prescriptions     Pending Prescriptions Disp Refills     butalbital-aspirin-caffeine (FIORINAL) -40 mg per capsule 20 capsule 0     Date Last Fill: 12/9/16  Pharmacy:  [facility] Submit electronically to pharmacy  Controlled Substance Agreement on File:   Encounter-Level CSA Scan Date:    There are no encounter-level csa scan date.       Last office visit: Last office visit pertaining to requested medication was 12/3/18.

## 2021-06-23 NOTE — TELEPHONE ENCOUNTER
Refill Request  Did you contact pharmacy: Yes  Medication name:   Requested Prescriptions     Pending Prescriptions Disp Refills     butalbital-aspirin-caffeine (FIORINAL) -40 mg per capsule 20 capsule 0     Who prescribed the medication: Chang Simmons MD  [facility] Name and Location: [facility]   Is patient out of medication: Yes  Patient notified refills processed in 72 hours:  yes  Okay to leave a detailed message: yes

## 2021-06-25 NOTE — TELEPHONE ENCOUNTER
RN cannot approve Refill Request    RN can NOT refill this medication No diagnosis for this medication      Last office visit: 2/6/2018 Chang Simmons MD Last Physical: 12/3/2018 Last MTM visit: Visit date not found Last visit same specialty: 2/6/2018 Chang Simmons MD.  Next visit within 3 mo: Visit date not found  Next physical within 3 mo: Visit date not found      [facility] Triage/Med Refill 3/14/2019    Requested Prescriptions   Pending Prescriptions Disp Refills     irbesartan-hydrochlorothiazide (AVALIDE) 300-12.5 mg per tablet [Pharmacy Med Name: IRBESARTAN/HCTZ 300-12.5MG TABLETS] 90 tablet 0     Sig: TAKE 1 TABLET BY MOUTH EVERY DAY    Diuretics/Combination Diuretics Refill Protocol  Passed - 3/14/2019  8:35 PM       Passed - Visit with PCP or prescribing provider visit in past 12 months    Last office visit with prescriber/PCP: 2/6/2018 Chang Simmons MD OR same dept: Visit date not found OR same specialty: 2/6/2018 Chang Simmons MD  Last physical: 12/3/2018 Last MTM visit: Visit date not found   Next visit within 3 mo: Visit date not found  Next physical within 3 mo: Visit date not found  Prescriber OR PCP: Chang Simmons MD  Last diagnosis associated with med order: There are no diagnoses linked to this encounter.  If protocol passes may refill for 12 months if within 3 months of last provider visit (or a total of 15 months).            Passed - Serum Potassium in past 12 months     Lab Results   Component Value Date    Potassium 3.8 12/03/2018            Passed - Serum Sodium in past 12 months     Lab Results   Component Value Date    Sodium 136 12/03/2018            Passed - Blood pressure on file in past 12 months    BP Readings from Last 1 Encounters:   01/02/19 144/71            Passed - Serum Creatinine in past 12 months     Creatinine   Date Value Ref Range Status   12/03/2018 0.73 0.60 - 1.10 mg/dL Final

## 2021-06-25 NOTE — PROGRESS NOTES
Progress Notes by Humza Becker at 2/16/2017 12:00 PM     Author: Humza Becker Service: -- Author Type: Nurse Practitioner    Filed: 2/26/2017 11:15 PM Encounter Date: 2/16/2017 Status: Signed    : [facility]    Date of Service: 2/16/2017  Primary Provider: Chang Simmons MD    Patient Care Team:  Chang Simmons MD as PCP - General     ______________________________________________________________________     Patient's Pharmacy:    [facility] [address] [place] [address] & [facility]  [address] [place] [id number]  Phone: [phone] Fax: [phone]     Patient's Insurance:    Payor: BLUE CROSS / Plan: BLUE CROSS PLATINUM / Product Type: COST PLAN /      ______________________________________________________________________     Mitra Hall is a 82 y.o. female who comes in today for:    Chief Complaint   Patient presents with   ? Facial Swelling     Neck swelling on the left side for 2 weeks. Pain sometimes while turning head to the right. No neck pain while sleeping. No neck injuries. No sore throats.      Patient Active Problem List   Diagnosis   ? Cellulitis   ? Adenocarcinoma Of The Cecum   ? Breast Cancer   ? Restless Legs Syndrome   ? Migraine Headache   ? Rotator Cuff Tendonitis   ? Prepatellar Bursitis   ? Concussion   ? Osteoporosis   ? Irritable bowel   ? Insomnia   ? Defecation urgency     Past Medical History:   Diagnosis Date   ? Breast cancer 1998    left mastectomy   ? Cancer     adenocarcinoma of colon [facility] - 30 years ago   ? Cancer     left breast ca 1998   ? Cataract    ? Colon cancer 1983   ? Osteoporosis     mild   ? RLS (restless legs syndrome)      Past Surgical History:   Procedure Laterality Date   ? BREAST BIOPSY Left 1998   ? COLECTOMY      [facility] [place]   ? MASTECTOMY Left 1998    [place]   ? REDUCTION MAMMAPLASTY Right 2000    done after left mastect.     No family  history on file.  Social History     Social History   ? Marital status:      Spouse name: N/A   ? Number of children: N/A   ? Years of education: N/A     Occupational History   ? Not on file.     Social History Main Topics   ? Smoking status: Former Smoker     Quit date: 12/16/1964   ? Smokeless tobacco: Not on file   ? Alcohol use 0.6 oz/week     1 Glasses of wine per week      Comment: 1 glass of wine/week   ? Drug use: No   ? Sexual activity: Not on file     Other Topics Concern   ? Not on file     Social History Narrative     Current Outpatient Prescriptions   Medication Sig Note   ? acetaminophen (TYLENOL) 650 MG CR tablet Take 650 mg by mouth every 8 (eight) hours as needed for pain.    ? aspirin 81 MG EC tablet Take 81 mg by mouth daily.    ? azelastine (ASTELIN) 137 mcg (0.1 %) nasal spray  8/6/2015: Received from: External Pharmacy   ? butalbital-aspirin-caffeine (FIORINAL) -40 mg per capsule TAKE 1 CAPSULE BY MOUTH THREE TIMES DAILY IF NEEDED    ? dicyclomine (BENTYL) 20 mg tablet Take 1 tablet (20 mg total) by mouth 3 (three) times a day as needed.    ? diphenoxylate-atropine (LOMOTIL) 2.5-0.025 mg per tablet Take 1 tablet by mouth 4 (four) times a day as needed for diarrhea.    ? fluticasone (FLONASE) 50 mcg/actuation nasal spray 1 spray into each nostril daily.    ? gabapentin (NEURONTIN) 800 MG tablet TAKE 1 TABLET BY MOUTH EVERY DAY    ? irbesartan-hydrochlorothiazide (AVALIDE) 300-12.5 mg per tablet TAKE 1 TABLET BY MOUTH EVERY DAY    ? LORazepam (ATIVAN) 0.5 MG tablet Take 1 tablet (0.5 mg total) by mouth bedtime as needed (sleep).    ? naproxen sodium (ALEVE) 220 MG tablet Take 220 mg by mouth 2 (two) times a day.    ? omeprazole (PRILOSEC) 20 MG capsule Take 20 mg by mouth daily.    ? pramipexole (MIRAPEX) 0.5 MG tablet TAKE 2 TABLETS BY MOUTH EVERY DAY    ? PREMARIN vaginal cream USE AS DIRECTED    ? SUMAtriptan (IMITREX) 25 MG tablet TAKE 1 TABLET BY MOUTH AS NEEDED FOR MIGRAINE   "  ? traZODone (DESYREL) 50 MG tablet TAKE 1 TABLET(50 MG) BY MOUTH EVERY NIGHT AT BEDTIME    ? traZODone (DESYREL) 50 MG tablet TAKE 1 TABLET(50 MG) BY MOUTH AT BEDTIME      ______________________________________________________________________     History of present illness:  Mitra Hall is a pleasant 82 year-old female who presents in clinic today with symptoms of swollen lymph node left side of her neck.  Her symptoms have been present for the last 2 weeks.  She is unable to find and palpate the LN to show me today, however, she did identify the left side of her neck in the tonsillar LN region. She has had difficulty at times turning her head to the left and stiffness on the left side of her neck.  This is worse upon waking up in the morning and gets better throughout the day.  She denies any dental problems on the left side of her mouth, any recent illnesses, any recent medication changes, and no recent travel.  She does report that recently she had been lifting some weights or dumbbells and holding them out in front of herself lifting them.      On review of systems, the patient has no fever, chills, night sweats, nausea, vomiting, chest pain or shortness of breath, or urinary problems.  Positive for symptoms as noted in the HPI.    ______________________________________________________________________      Wt Readings from Last 3 Encounters:   02/16/17 127 lb (57.6 kg)   01/03/17 121 lb (54.9 kg)   05/20/16 127 lb (57.6 kg)     BP Readings from Last 3 Encounters:   02/16/17 122/84   01/03/17 120/70   05/20/16 118/70      Visit Vitals   ? /84   ? Pulse 74   ? Temp 97.1  F (36.2  C)   ? Ht 4' 11\" (1.499 m)   ? Wt 127 lb (57.6 kg)   ? LMP 06/20/1987   ? BMI 25.65 kg/m2        Physical Exam:  General Appearance: Alert, cooperative, no distress, appears stated age  Head: Normocephalic, without obvious abnormality, atraumatic  Eyes: PERRL, conjunctiva/corneas clear  Ears: Normal TM's and external ear " canals, mild cerumen in canals, both ears  Nose: Nares normal, septum midline,mucosa normal, no drainage  Throat: Lips, mucosa, and tongue normal; teeth and gums normal  Neck: Supple, symmetrical, trachea midline, no palpable adenopathy; thyroid: not enlarged, symmetric, no tenderness/mass/nodules; mild decreased ROM L>R with rotation. Flexion/extension normal.  Lungs: Clear to auscultation bilaterally, respirations unlabored  Heart: Regular rate and rhythm, S1 and S2 normal, no murmur, rub, or gallop  Abdomen: Soft, non-tender, bowel sounds active all four quadrants  Lymph nodes: Cervical, preauricular, postauricular, parotid, tonsillar, submandibular, submental, supraclavicular nodes normal  Neurologic: She is alert and oriented x 3.  Psychiatric: She has a normal mood and affect.     ______________________________________________________________________     Assessment:    1. Enlarged lymph node in neck - appears to have resolved, no palpable LNs noted on exam.   2. Posterolateral cervical muscle strain - could also represent osteoarthritis of cervical spine      ______________________________________________________________________     Plan:  Patient Instructions   1. Aleve 1 tablet by mouth twice daily as needed for neck discomfort  2. May try applying warm heat therapy as tolerated to affected areas of neck as needed    Humza Becker, [facility]  Internal Medicine  [facility]    Return if symptoms worsen or fail to improve.

## 2021-06-27 NOTE — PROGRESS NOTES
Progress Notes by Vvii Currie CNP at 1/2/2019  3:20 PM     Author: Vivi Currie CNP Service: -- Author Type: Nurse Practitioner    Filed: 1/5/2019  3:50 PM Encounter Date: 1/2/2019 Status: Signed    : Vivi Currie CNP (Nurse Practitioner)       Chief Complaint   Patient presents with   ? Vaginal Discharge       ASSESSMENT & PLAN:   Diagnoses and all orders for this visit:    Vaginal discharge  -     Wet Prep, Vaginal  -     Ambulatory referral to Gynecology        MDM:  Vaginal discharge of unknown etiology of 2 months duration.  Patient and I discussed and patient would like to follow-up with gynecology, specifically [facility].  Patient informed of both vaginal dryness and vaginal discharge could be signs of atrophic vaginitis, for instance.    Supportive care discussed.  See discharge instructions below for specific recommendations given.    At the end of the encounter, I discussed results, diagnosis, medications. Discussed red flags for immediate return to clinic/ER, as well as indications for follow up if no improvement. Patient and/or caregiver understood and agreed to plan. Patient was stable for discharge.    SUBJECTIVE    HPI:  Vaginal discharge for two months.  Was seen here Jan 2018 and had BV.  No itching, burning.      Is on Estrace vaginal cream.      History obtained from the patient.    Past Medical History:   Diagnosis Date   ? Breast cancer (H) 1998    left mastectomy   ? Breast injury     10/2017 fell flat of chest while walking   ? Cancer (H)     adenocarcinoma of colon [facility] - 30 years ago   ? Cancer (H)     left breast ca 1998   ? Cataract    ? Colon cancer (H) 1983   ? Osteoporosis     mild   ? RLS (restless legs syndrome)        Problem List:  2018-02: Vaginal dryness  2018-02: Cervical arthritis  2018-02: Cervical stenosis of spine  2018-02: Dizziness  2017-06: Osteoarthritis, knee  2017-01: Defecation urgency  2015-08: Insomnia  2015-03: Irritable  bowel  Cellulitis  Adenocarcinoma Of The Cecum  Breast Cancer  Restless Legs Syndrome  Migraine Headache  Rotator Cuff Tendonitis  Prepatellar Bursitis  Concussion  Osteoporosis      Social History     Tobacco Use   ? Smoking status: Former Smoker     Last attempt to quit: 1964     Years since quittin.0   ? Smokeless tobacco: Never Used   Substance Use Topics   ? Alcohol use: Yes     Alcohol/week: 0.6 oz     Types: 1 Glasses of wine per week     Comment: 1 glass of wine/week       Review of Systems   Constitutional: Negative for chills and fever.   Gastrointestinal: Negative for abdominal pain and vomiting.   Genitourinary: Positive for vaginal discharge. Negative for difficulty urinating, dysuria, genital sores and vaginal pain.       OBJECTIVE    Vitals:    19 1532 19 1536   BP: 143/77 144/71   Patient Site: Right Arm Right Arm   Patient Position: Sitting Sitting   Cuff Size: Adult Regular Adult Regular   Pulse: 66 70   Resp: 18    Temp: 97.3  F (36.3  C)    TempSrc: Oral    SpO2: 100%    Weight: 128 lb 4 oz (58.2 kg)        Physical Exam   Constitutional: She is oriented to person, place, and time. She appears well-developed and well-nourished. No distress.   HENT:   Right Ear: External ear normal.   Left Ear: External ear normal.   Eyes: Conjunctivae are normal. Right eye exhibits no discharge. Left eye exhibits no discharge.   Cardiovascular: Intact distal pulses.   Pulmonary/Chest: Effort normal.   Genitourinary:   Genitourinary Comments: Patient self swabbed and declined pelvic exam today.   Musculoskeletal: Normal range of motion.   Neurological: She is alert and oriented to person, place, and time.   Skin: Skin is warm and dry. Capillary refill takes less than 2 seconds.   Psychiatric: She has a normal mood and affect. Her behavior is normal. Judgment and thought content normal.       Labs:  Recent Results (from the past 240 hour(s))   Wet Prep, Vaginal   Result Value Ref Range     Yeast Result No yeast seen No yeast seen    Trichomonas No Trichomonas seen No Trichomonas seen    Clue Cells, Wet Prep No Clue cells seen No Clue cells seen         Radiology:    No results found.    PATIENT INSTRUCTIONS:   Patient Instructions   The swab we did today was normal, no bacterial vaginosis nor yeast seen.  Sometimes increased vaginal discharge is as result of low estrogen level secondary to menopause.    I would discuss this issue with your women's health provider.  Patient Education     Atrophic Vaginitis    Atrophic vaginitis means the walls of your vagina have become thin. This happens when your body makes too little of the hormone estrogen. Menopause or surgical removal of the ovaries are the most common causes for a drop in estrogen. Breastfeeding can also cause the hormone level to drop.  Symptoms of atrophic vaginitis include:    Dryness, soreness, burning, or itching in the vagina    Vaginal discharge  Sex can be uncomfortable, even painful. After sex, you may have bleeding from your vagina. You may also have burning or pain when you urinate.  Home care  Your healthcare provider may recommend 1 or more of these as treatment:    Vaginal creams, lotions, and lubricants. These products help relieve vaginal dryness. They dont need a prescription. They can be found in the personal care section of most pharmacies. Creams and lotions are used daily to help keep the vagina moist. Lubricants help reduce dryness and pain during sex. Choose water-based lubricants. Dont use petroleum jelly, mineral oil, or other oils. These increase the chance of infection.     Hormone therapy (HT). HT increases the amount of estrogen in your body. This can help manage or relieve symptoms. HT can be given in pill form. It may be given as a lotion, cream, ring put into the vagina, or a patch on the skin. The risks and benefits of HT vary for each woman. For instance, your risk may be higher if you have had breast cancer.  Discuss this treatment with your healthcare provider. Not every woman can use HT.  You dont need to give up (abstain from) sex. In fact, regular sex can help keep vaginal tissues healthy. Take steps to make sex more comfortable by using water-based lubricants.  Preventing infections  Atrophic vaginitis makes an infection of the vagina or the urinary tract more likely. To help reduce your risk:    Keep your genitals clean. When you bathe, wash the outside of your vagina with mild soap and water. Clean gently between the folds of your vagina.    Wipe from front to back after a bowel movement.    Dont douche unless your healthcare provider tells you to.    Avoid scented toilet paper, scented vaginal sprays, and scented tampons.    Avoid wearing clothes that are tight in the crotch. These include pantyhose, jeans, and leggings. Wear cotton underwear. Change it every day.  Follow-up care  Follow up with your healthcare provider, or as advised.  When to seek medical advice  Call your health care provider right away if any of these occur:    Fever of 100.4 F (38 C) or higher, or as directed by your healthcare provider    Symptoms dont go away or get worse even with treatment    Vaginal area swells or becomes painful    Vaginal area bleeds, but not because of your period    Bad-smelling discharge from the vagina    Pain or burning when you urinate or you have trouble passing urine    Open sores develop around vagina   Date Last Reviewed: 12/1/2016 2000-2017 [employer]. [address] [place] [address], [place] [address]. All rights reserved. This information is not intended as a substitute for professional medical care. Always follow your healthcare professional's instructions.

## 2021-07-03 NOTE — ADDENDUM NOTE
Addendum Note by Micah Salgado CMA at 11/29/2018  3:09 PM     Author: Micah Salgado CMA Service: -- Author Type: Certified Medical Assistant    Filed: 11/29/2018  3:09 PM Encounter Date: 11/19/2018 Status: Signed    : Micah Salgado CMA (Certified Medical Assistant)    Addended by: MICAH SALGADO on: 11/29/2018 03:09 PM        Modules accepted: Orders

## 2021-08-08 ENCOUNTER — HEALTH MAINTENANCE LETTER (OUTPATIENT)
Age: 86
End: 2021-08-08

## 2021-10-04 ENCOUNTER — HEALTH MAINTENANCE LETTER (OUTPATIENT)
Age: 86
End: 2021-10-04

## 2021-12-23 DIAGNOSIS — J31.0 CHRONIC RHINITIS: Primary | ICD-10-CM

## 2021-12-23 NOTE — TELEPHONE ENCOUNTER
Refill Request  Medication name: Pending Prescriptions:                       Disp   Refills    fluticasone (FLONASE) 50 MCG/ACT nasal sp*                    Sig: Spray 2 sprays into both nostrils daily    Who prescribed the medication: Vane Blount  Last refill on medication: pt reported   [facility]ed Pharmacy: [facility]  Last appointment with PCP: 11/17/2020  Next appointment: Patient due for appointment

## 2021-12-27 RX ORDER — FLUTICASONE PROPIONATE 50 MCG
2 SPRAY, SUSPENSION (ML) NASAL DAILY
Qty: 16 G | Refills: 0 | Status: SHIPPED | OUTPATIENT
Start: 2021-12-27 | End: 2022-02-21

## 2021-12-27 NOTE — TELEPHONE ENCOUNTER
"Routing refill request to provider for review/approval because:  Patient needs to be seen because it has been more than 1 year since last office visit.    Last Written Prescription Date:  11/30/2020  Last Fill Quantity: 16g,  # refills: 11   Last office visit provider:  11/25/19         Requested Prescriptions   Pending Prescriptions Disp Refills     fluticasone (FLONASE) 50 MCG/ACT nasal spray       Sig: Spray 2 sprays into both nostrils daily       Nasal Allergy Protocol Failed - 12/23/2021  5:44 PM        Failed - Recent (12 mo) or future (30 days) visit within the authorizing provider's specialty     Patient has had an office visit with the authorizing provider or a provider within the authorizing providers department within the previous 12 mos or has a future within next 30 days. See \"Patient Info\" tab in inbasket, or \"Choose Columns\" in Meds & Orders section of the refill encounter.              Passed - Patient is age 12 or older        Passed - Medication is active on med list             Janae Coelho RN 12/27/21 3:32 PM  "

## 2022-02-18 DIAGNOSIS — J31.0 CHRONIC RHINITIS: ICD-10-CM

## 2022-02-21 RX ORDER — FLUTICASONE PROPIONATE 50 MCG
2 SPRAY, SUSPENSION (ML) NASAL DAILY
Qty: 16 G | Refills: 0 | Status: SHIPPED | OUTPATIENT
Start: 2022-02-21 | End: 2022-04-08

## 2022-02-21 NOTE — TELEPHONE ENCOUNTER
"Former patient of ??? & has not established care with another provider.  Please assign refill request to covering provider per clinic standard process.    Routing refill request to provider for review/approval because:  Patient needs to be seen because it has been more than 1 year since last office visit.  No PCP    Last Written Prescription Date:  12/27/21  Last Fill Quantity: 16 g,  # refills: 0   Last office visit provider:  7/9/20     Requested Prescriptions   Pending Prescriptions Disp Refills     fluticasone (FLONASE) 50 MCG/ACT nasal spray 16 g 0     Sig: Spray 2 sprays into both nostrils daily       Nasal Allergy Protocol Failed - 2/21/2022  9:52 AM        Failed - Recent (12 mo) or future (30 days) visit within the authorizing provider's specialty     Patient has had an office visit with the authorizing provider or a provider within the authorizing providers department within the previous 12 mos or has a future within next 30 days. See \"Patient Info\" tab in inbasket, or \"Choose Columns\" in Meds & Orders section of the refill encounter.              Passed - Patient is age 12 or older        Passed - Medication is active on med list             Ford Holloway RN 02/21/22 9:52 AM  "

## 2022-02-21 NOTE — TELEPHONE ENCOUNTER
Writer spoke to patient.  She no longer comes to this clinic.  She will contact pharmacy to have them send to correct provider.  Ok to deny this request.

## 2022-04-07 DIAGNOSIS — J31.0 CHRONIC RHINITIS: ICD-10-CM

## 2022-04-08 RX ORDER — FLUTICASONE PROPIONATE 50 MCG
SPRAY, SUSPENSION (ML) NASAL
Qty: 48 G | Refills: 11 | Status: SHIPPED | OUTPATIENT
Start: 2022-04-08

## 2022-04-08 NOTE — TELEPHONE ENCOUNTER
"Routing refill request to provider for review/approval because:  Patient needs to be seen because it has been more than 1 year since last office visit.    Last Written Prescription Date:  2/21/22  Last Fill Quantity: 16 g,  # refills: 0   Last office visit provider:  7/9/20     Requested Prescriptions   Pending Prescriptions Disp Refills     fluticasone (FLONASE) 50 MCG/ACT nasal spray [Pharmacy Med Name: FLUTICASONE 50MCG NASAL SP (120) RX] 48 g      Sig: SHAKE LIQUID AND USE 2 SPRAYS IN EACH NOSTRIL DAILY       Nasal Allergy Protocol Failed - 4/8/2022  2:14 PM        Failed - Recent (12 mo) or future (30 days) visit within the authorizing provider's specialty     Patient has had an office visit with the authorizing provider or a provider within the authorizing providers department within the previous 12 mos or has a future within next 30 days. See \"Patient Info\" tab in inbasket, or \"Choose Columns\" in Meds & Orders section of the refill encounter.              Passed - Patient is age 12 or older        Passed - Medication is active on med list             Ford Holloway RN 04/08/22 2:16 PM  "

## 2022-09-11 ENCOUNTER — HEALTH MAINTENANCE LETTER (OUTPATIENT)
Age: 87
End: 2022-09-11

## 2023-05-19 DIAGNOSIS — J31.0 CHRONIC RHINITIS: ICD-10-CM

## 2023-05-20 NOTE — TELEPHONE ENCOUNTER
"Former patient of Jose Alejandro & has not established care with another provider.  Please assign refill request to covering provider per clinic standard process.    Routing refill request to provider for review/approval because:  Patient needs to be seen because it has been more than 1 year since last office visit.    Last Written Prescription Date:  4/8/22  Last Fill Quantity: 48g,  # refills: 11   Last office visit provider:  7/9/20     Requested Prescriptions   Pending Prescriptions Disp Refills     fluticasone (FLONASE) 50 MCG/ACT nasal spray [Pharmacy Med Name: FLUTICASONE 50MCG NASAL SP (120) RX] 48 g 11     Sig: SHAKE LIQUID AND USE 2 SPRAYS IN EACH NOSTRIL DAILY       Nasal Allergy Protocol Failed - 5/19/2023 11:46 PM        Failed - Recent (12 mo) or future (30 days) visit within the authorizing provider's specialty     Patient has had an office visit with the authorizing provider or a provider within the authorizing providers department within the previous 12 mos or has a future within next 30 days. See \"Patient Info\" tab in inbasket, or \"Choose Columns\" in Meds & Orders section of the refill encounter.              Passed - Patient is age 12 or older        Passed - Medication is active on med list             Earline Sánchez RN 05/19/23 11:46 PM  "

## 2023-05-22 RX ORDER — FLUTICASONE PROPIONATE 50 MCG
SPRAY, SUSPENSION (ML) NASAL
Qty: 48 G | Refills: 11 | OUTPATIENT
Start: 2023-05-22

## 2023-12-16 ENCOUNTER — HEALTH MAINTENANCE LETTER (OUTPATIENT)
Age: 88
End: 2023-12-16

## 2024-02-24 ENCOUNTER — HEALTH MAINTENANCE LETTER (OUTPATIENT)
Age: 89
End: 2024-02-24

## (undated) DEVICE — PREP DURAPREP 26ML APL 8630

## (undated) DEVICE — GLOVE PROTEXIS BLUE W/NEU-THERA 8.0  2D73EB80

## (undated) DEVICE — SUCTION IRR SYSTEM W/O TIP INTERPULSE HANDPIECE 0210-100-000

## (undated) DEVICE — BONE CLEANING TIP INTERPULSE  0210-010-000

## (undated) DEVICE — BLADE SAW SAGITTAL STRK 19.5X90X1.27MM 2108-109-000S11

## (undated) DEVICE — GLOVE PROTEXIS BLUE W/NEU-THERA 7.5  2D73EB75

## (undated) DEVICE — GLOVE PROTEXIS W/NEU-THERA 8.0  2D73TE80

## (undated) DEVICE — TOURNIQUET CUFF 30" STERILE 60-7070-105

## (undated) DEVICE — SYR 10ML FINGER CONTROL W/O NDL 309695

## (undated) DEVICE — BLADE SAW RECIP STRK 77.5X11X1.23MM 0277-096-326

## (undated) DEVICE — HOOD FLYTE W/PEELAWAY 408-800-100

## (undated) DEVICE — SU VICRYL 2-0 CT-2 27" UND J269H

## (undated) DEVICE — Device

## (undated) DEVICE — PIN FIXATION STRK EX-PIN ORTHOLOCK 3X110MM 6007-103-110

## (undated) DEVICE — GOWN XLG DISP 9545

## (undated) DEVICE — DRAIN HEMOVAC RESERVOIR KIT 10FR 1/8" MED 00-2550-002-10

## (undated) DEVICE — ESU GROUND PAD UNIVERSAL W/O CORD

## (undated) DEVICE — NDL 22GA 1.5"

## (undated) DEVICE — SOL NACL 0.9% IRRIG 3000ML BAG 2B7477

## (undated) DEVICE — SOL WATER IRRIG 1000ML BOTTLE 2F7114

## (undated) DEVICE — DRSG GAUZE 4X8"

## (undated) DEVICE — LINEN BACK PACK 5440

## (undated) DEVICE — LINEN GOWN X4 5410

## (undated) DEVICE — CARD NAVIGATION SOFTWARE STRK KNEE C4C 6003-640-999

## (undated) DEVICE — DRSG STERI STRIP 1X5" R1548

## (undated) DEVICE — SU MONOCRYL 3-0 PS-1 27" Y936H

## (undated) DEVICE — PEN MARKING SKIN W/LABELS 31145918

## (undated) DEVICE — BASIN SET MAJOR

## (undated) DEVICE — DECANTER TRANSFER DEVICE 2008S

## (undated) DEVICE — DRSG AQUACEL AG 3.5X9.75" HYDROFIBER 412011

## (undated) DEVICE — STRAP KNEE/BODY 31143004

## (undated) DEVICE — DRSG DRAIN 4X4" 7086

## (undated) DEVICE — BONE CEMENT MIXEVAC III HI VAC KIT  0206-015-000

## (undated) DEVICE — BLADE KNIFE SURG 15 371115

## (undated) DEVICE — LINEN TOWEL PACK X5 5464

## (undated) DEVICE — SOL NACL 0.9% IRRIG 1000ML BOTTLE 2F7124

## (undated) DEVICE — CAST PADDING 6" STERILE 9046S

## (undated) DEVICE — SU VICRYL 2-0 CT-1 27" UND J259H

## (undated) DEVICE — SU VICRYL 1 CT-1 27" UND J261H

## (undated) DEVICE — BATTERY STRYKER NAVIGATION SYSTEM 6000-006-000

## (undated) DEVICE — SUCTION MANIFOLD DORNOCH ULTRA CART UL-CL500

## (undated) DEVICE — SU MONOCRYL 4-0 PS-2 18" UND Y496G

## (undated) RX ORDER — DEXAMETHASONE SODIUM PHOSPHATE 4 MG/ML
INJECTION, SOLUTION INTRA-ARTICULAR; INTRALESIONAL; INTRAMUSCULAR; INTRAVENOUS; SOFT TISSUE
Status: DISPENSED
Start: 2017-06-26

## (undated) RX ORDER — CEFAZOLIN SODIUM 2 G/100ML
INJECTION, SOLUTION INTRAVENOUS
Status: DISPENSED
Start: 2017-06-26

## (undated) RX ORDER — PHENYLEPHRINE HCL IN 0.9% NACL 1 MG/10 ML
SYRINGE (ML) INTRAVENOUS
Status: DISPENSED
Start: 2017-06-26

## (undated) RX ORDER — FENTANYL CITRATE 50 UG/ML
INJECTION, SOLUTION INTRAMUSCULAR; INTRAVENOUS
Status: DISPENSED
Start: 2017-06-26

## (undated) RX ORDER — GABAPENTIN 100 MG/1
CAPSULE ORAL
Status: DISPENSED
Start: 2017-06-26

## (undated) RX ORDER — BUPIVACAINE HYDROCHLORIDE AND EPINEPHRINE 5; 5 MG/ML; UG/ML
INJECTION, SOLUTION PERINEURAL
Status: DISPENSED
Start: 2017-06-26

## (undated) RX ORDER — PROPOFOL 10 MG/ML
INJECTION, EMULSION INTRAVENOUS
Status: DISPENSED
Start: 2017-06-26

## (undated) RX ORDER — LIDOCAINE HYDROCHLORIDE 20 MG/ML
INJECTION, SOLUTION EPIDURAL; INFILTRATION; INTRACAUDAL; PERINEURAL
Status: DISPENSED
Start: 2017-06-26

## (undated) RX ORDER — ACETAMINOPHEN 325 MG/1
TABLET ORAL
Status: DISPENSED
Start: 2017-06-26

## (undated) RX ORDER — ONDANSETRON 2 MG/ML
INJECTION INTRAMUSCULAR; INTRAVENOUS
Status: DISPENSED
Start: 2017-06-26

## (undated) RX ORDER — CELECOXIB 200 MG/1
CAPSULE ORAL
Status: DISPENSED
Start: 2017-06-26